# Patient Record
Sex: MALE | Race: WHITE | NOT HISPANIC OR LATINO | Employment: FULL TIME | URBAN - METROPOLITAN AREA
[De-identification: names, ages, dates, MRNs, and addresses within clinical notes are randomized per-mention and may not be internally consistent; named-entity substitution may affect disease eponyms.]

---

## 2017-03-22 ENCOUNTER — ALLSCRIPTS OFFICE VISIT (OUTPATIENT)
Dept: OTHER | Facility: OTHER | Age: 62
End: 2017-03-22

## 2017-03-22 DIAGNOSIS — I10 ESSENTIAL (PRIMARY) HYPERTENSION: ICD-10-CM

## 2017-03-22 DIAGNOSIS — K22.70 BARRETT'S ESOPHAGUS WITHOUT DYSPLASIA: ICD-10-CM

## 2017-03-22 DIAGNOSIS — K21.9 GASTRO-ESOPHAGEAL REFLUX DISEASE WITHOUT ESOPHAGITIS: ICD-10-CM

## 2017-03-22 DIAGNOSIS — R94.5 ABNORMAL RESULTS OF LIVER FUNCTION STUDIES: ICD-10-CM

## 2017-03-22 DIAGNOSIS — E29.1 TESTICULAR HYPOFUNCTION: ICD-10-CM

## 2017-03-22 DIAGNOSIS — N52.9 MALE ERECTILE DYSFUNCTION: ICD-10-CM

## 2017-03-22 DIAGNOSIS — E55.9 VITAMIN D DEFICIENCY: ICD-10-CM

## 2017-03-22 DIAGNOSIS — Z12.5 ENCOUNTER FOR SCREENING FOR MALIGNANT NEOPLASM OF PROSTATE: ICD-10-CM

## 2017-03-23 ENCOUNTER — APPOINTMENT (OUTPATIENT)
Dept: LAB | Facility: MEDICAL CENTER | Age: 62
End: 2017-03-23
Payer: COMMERCIAL

## 2017-03-23 DIAGNOSIS — K21.9 GASTRO-ESOPHAGEAL REFLUX DISEASE WITHOUT ESOPHAGITIS: ICD-10-CM

## 2017-03-23 DIAGNOSIS — N52.9 MALE ERECTILE DYSFUNCTION: ICD-10-CM

## 2017-03-23 DIAGNOSIS — I10 ESSENTIAL (PRIMARY) HYPERTENSION: ICD-10-CM

## 2017-03-23 DIAGNOSIS — E29.1 TESTICULAR HYPOFUNCTION: ICD-10-CM

## 2017-03-23 DIAGNOSIS — K22.70 BARRETT'S ESOPHAGUS WITHOUT DYSPLASIA: ICD-10-CM

## 2017-03-23 DIAGNOSIS — E55.9 VITAMIN D DEFICIENCY: ICD-10-CM

## 2017-03-23 DIAGNOSIS — A04.8 OTHER SPECIFIED BACTERIAL INTESTINAL INFECTIONS: ICD-10-CM

## 2017-03-23 DIAGNOSIS — R94.5 ABNORMAL RESULTS OF LIVER FUNCTION STUDIES: ICD-10-CM

## 2017-03-23 DIAGNOSIS — Z12.5 ENCOUNTER FOR SCREENING FOR MALIGNANT NEOPLASM OF PROSTATE: ICD-10-CM

## 2017-03-23 LAB
25(OH)D3 SERPL-MCNC: 46.8 NG/ML (ref 30–100)
ALBUMIN SERPL BCP-MCNC: 3.9 G/DL (ref 3.5–5)
ALP SERPL-CCNC: 59 U/L (ref 46–116)
ALT SERPL W P-5'-P-CCNC: 83 U/L (ref 12–78)
ANION GAP SERPL CALCULATED.3IONS-SCNC: 5 MMOL/L (ref 4–13)
AST SERPL W P-5'-P-CCNC: 28 U/L (ref 5–45)
BASOPHILS # BLD AUTO: 0.02 THOUSANDS/ΜL (ref 0–0.1)
BASOPHILS NFR BLD AUTO: 0 % (ref 0–1)
BILIRUB SERPL-MCNC: 0.64 MG/DL (ref 0.2–1)
BUN SERPL-MCNC: 13 MG/DL (ref 5–25)
CALCIUM SERPL-MCNC: 9 MG/DL (ref 8.3–10.1)
CHLORIDE SERPL-SCNC: 105 MMOL/L (ref 100–108)
CHOLEST SERPL-MCNC: 128 MG/DL (ref 50–200)
CO2 SERPL-SCNC: 31 MMOL/L (ref 21–32)
CREAT SERPL-MCNC: 0.95 MG/DL (ref 0.6–1.3)
EOSINOPHIL # BLD AUTO: 0.11 THOUSAND/ΜL (ref 0–0.61)
EOSINOPHIL NFR BLD AUTO: 2 % (ref 0–6)
ERYTHROCYTE [DISTWIDTH] IN BLOOD BY AUTOMATED COUNT: 14.6 % (ref 11.6–15.1)
GFR SERPL CREATININE-BSD FRML MDRD: >60 ML/MIN/1.73SQ M
GLUCOSE P FAST SERPL-MCNC: 79 MG/DL (ref 65–99)
HCT VFR BLD AUTO: 49.4 % (ref 36.5–49.3)
HDLC SERPL-MCNC: 36 MG/DL (ref 40–60)
HGB BLD-MCNC: 16.5 G/DL (ref 12–17)
LDLC SERPL CALC-MCNC: 67 MG/DL (ref 0–100)
LYMPHOCYTES # BLD AUTO: 2.42 THOUSANDS/ΜL (ref 0.6–4.47)
LYMPHOCYTES NFR BLD AUTO: 32 % (ref 14–44)
MCH RBC QN AUTO: 29.8 PG (ref 26.8–34.3)
MCHC RBC AUTO-ENTMCNC: 33.4 G/DL (ref 31.4–37.4)
MCV RBC AUTO: 89 FL (ref 82–98)
MONOCYTES # BLD AUTO: 0.89 THOUSAND/ΜL (ref 0.17–1.22)
MONOCYTES NFR BLD AUTO: 12 % (ref 4–12)
NEUTROPHILS # BLD AUTO: 4.02 THOUSANDS/ΜL (ref 1.85–7.62)
NEUTS SEG NFR BLD AUTO: 54 % (ref 43–75)
NRBC BLD AUTO-RTO: 0 /100 WBCS
PLATELET # BLD AUTO: 275 THOUSANDS/UL (ref 149–390)
PMV BLD AUTO: 11.3 FL (ref 8.9–12.7)
POTASSIUM SERPL-SCNC: 4 MMOL/L (ref 3.5–5.3)
PROT SERPL-MCNC: 7.6 G/DL (ref 6.4–8.2)
PSA SERPL-MCNC: 2 NG/ML (ref 0–4)
RBC # BLD AUTO: 5.54 MILLION/UL (ref 3.88–5.62)
SODIUM SERPL-SCNC: 141 MMOL/L (ref 136–145)
TESTOST SERPL-MCNC: 299.7 NG/DL (ref 241–827)
TRIGL SERPL-MCNC: 127 MG/DL
WBC # BLD AUTO: 7.47 THOUSAND/UL (ref 4.31–10.16)

## 2017-03-23 PROCEDURE — G0103 PSA SCREENING: HCPCS

## 2017-03-23 PROCEDURE — 82306 VITAMIN D 25 HYDROXY: CPT

## 2017-03-23 PROCEDURE — 85025 COMPLETE CBC W/AUTO DIFF WBC: CPT

## 2017-03-23 PROCEDURE — 87338 HPYLORI STOOL AG IA: CPT

## 2017-03-23 PROCEDURE — 80061 LIPID PANEL: CPT

## 2017-03-23 PROCEDURE — 80053 COMPREHEN METABOLIC PANEL: CPT

## 2017-03-23 PROCEDURE — 36415 COLL VENOUS BLD VENIPUNCTURE: CPT

## 2017-03-23 PROCEDURE — 84403 ASSAY OF TOTAL TESTOSTERONE: CPT

## 2017-03-24 LAB — H PYLORI AG STL QL IA: NEGATIVE

## 2017-03-27 ENCOUNTER — GENERIC CONVERSION - ENCOUNTER (OUTPATIENT)
Dept: OTHER | Facility: OTHER | Age: 62
End: 2017-03-27

## 2017-03-28 ENCOUNTER — GENERIC CONVERSION - ENCOUNTER (OUTPATIENT)
Dept: OTHER | Facility: OTHER | Age: 62
End: 2017-03-28

## 2017-06-14 ENCOUNTER — ALLSCRIPTS OFFICE VISIT (OUTPATIENT)
Dept: OTHER | Facility: OTHER | Age: 62
End: 2017-06-14

## 2017-08-14 DIAGNOSIS — I10 ESSENTIAL (PRIMARY) HYPERTENSION: ICD-10-CM

## 2017-08-14 DIAGNOSIS — E55.9 VITAMIN D DEFICIENCY: ICD-10-CM

## 2017-08-14 DIAGNOSIS — R94.5 ABNORMAL RESULTS OF LIVER FUNCTION STUDIES: ICD-10-CM

## 2017-08-14 DIAGNOSIS — K21.9 GASTRO-ESOPHAGEAL REFLUX DISEASE WITHOUT ESOPHAGITIS: ICD-10-CM

## 2017-08-14 DIAGNOSIS — R51 HEADACHE(784.0): ICD-10-CM

## 2017-09-06 ENCOUNTER — ALLSCRIPTS OFFICE VISIT (OUTPATIENT)
Dept: OTHER | Facility: OTHER | Age: 62
End: 2017-09-06

## 2017-09-07 ENCOUNTER — ALLSCRIPTS OFFICE VISIT (OUTPATIENT)
Dept: OTHER | Facility: OTHER | Age: 62
End: 2017-09-07

## 2017-09-10 ENCOUNTER — LAB CONVERSION - ENCOUNTER (OUTPATIENT)
Dept: OTHER | Facility: OTHER | Age: 62
End: 2017-09-10

## 2017-09-10 LAB
25(OH)D3 SERPL-MCNC: 56 NG/ML (ref 30–100)
A/G RATIO (HISTORICAL): 1.5 (CALC) (ref 1–2.5)
ALBUMIN SERPL BCP-MCNC: 4.3 G/DL (ref 3.6–5.1)
ALP SERPL-CCNC: 75 U/L (ref 40–115)
ALT SERPL W P-5'-P-CCNC: 41 U/L (ref 9–46)
AST SERPL W P-5'-P-CCNC: 19 U/L (ref 10–35)
BASOPHILS # BLD AUTO: 0.4 %
BASOPHILS # BLD AUTO: 19 CELLS/UL (ref 0–200)
BILIRUB SERPL-MCNC: 0.6 MG/DL (ref 0.2–1.2)
BUN SERPL-MCNC: 15 MG/DL (ref 7–25)
BUN/CREA RATIO (HISTORICAL): NORMAL (CALC) (ref 6–22)
CALCIUM SERPL-MCNC: 9.5 MG/DL (ref 8.6–10.3)
CHLORIDE SERPL-SCNC: 105 MMOL/L (ref 98–110)
CHOLEST SERPL-MCNC: 137 MG/DL
CHOLEST/HDLC SERPL: 3.4 (CALC)
CO2 SERPL-SCNC: 29 MMOL/L (ref 20–31)
CREAT SERPL-MCNC: 0.88 MG/DL (ref 0.7–1.25)
DEPRECATED RDW RBC AUTO: 13.8 % (ref 11–15)
EGFR AFRICAN AMERICAN (HISTORICAL): 107 ML/MIN/1.73M2
EGFR-AMERICAN CALC (HISTORICAL): 93 ML/MIN/1.73M2
EOSINOPHIL # BLD AUTO: 1.5 %
EOSINOPHIL # BLD AUTO: 72 CELLS/UL (ref 15–500)
GAMMA GLOBULIN (HISTORICAL): 2.8 G/DL (CALC) (ref 1.9–3.7)
GLUCOSE (HISTORICAL): 86 MG/DL (ref 65–99)
HCT VFR BLD AUTO: 47.9 % (ref 38.5–50)
HDLC SERPL-MCNC: 40 MG/DL
HGB BLD-MCNC: 15.6 G/DL (ref 13.2–17.1)
LDL CHOLESTEROL (HISTORICAL): 78 MG/DL (CALC)
LYMPHOCYTES # BLD AUTO: 1882 CELLS/UL (ref 850–3900)
LYMPHOCYTES # BLD AUTO: 39.2 %
MCH RBC QN AUTO: 28.6 PG (ref 27–33)
MCHC RBC AUTO-ENTMCNC: 32.6 G/DL (ref 32–36)
MCV RBC AUTO: 87.9 FL (ref 80–100)
MONOCYTES # BLD AUTO: 528 CELLS/UL (ref 200–950)
MONOCYTES (HISTORICAL): 11 %
NEUTROPHILS # BLD AUTO: 2299 CELLS/UL (ref 1500–7800)
NEUTROPHILS # BLD AUTO: 47.9 %
NON-HDL-CHOL (CHOL-HDL) (HISTORICAL): 97 MG/DL (CALC)
PLATELET # BLD AUTO: 188 THOUSAND/UL (ref 140–400)
PMV BLD AUTO: 11.3 FL (ref 7.5–12.5)
POTASSIUM SERPL-SCNC: 4.3 MMOL/L (ref 3.5–5.3)
RBC # BLD AUTO: 5.45 MILLION/UL (ref 4.2–5.8)
SODIUM SERPL-SCNC: 141 MMOL/L (ref 135–146)
TESTOSTERONE FREE (HISTORICAL): 31.2 PG/ML (ref 35–155)
TESTOSTERONE TOTAL (HISTORICAL): 190 NG/DL (ref 250–1100)
TOTAL PROTEIN (HISTORICAL): 7.1 G/DL (ref 6.1–8.1)
TRIGL SERPL-MCNC: 103 MG/DL
TSH SERPL DL<=0.05 MIU/L-ACNC: 1.47 MIU/L (ref 0.4–4.5)
WBC # BLD AUTO: 4.8 THOUSAND/UL (ref 3.8–10.8)

## 2017-09-11 ENCOUNTER — GENERIC CONVERSION - ENCOUNTER (OUTPATIENT)
Dept: OTHER | Facility: OTHER | Age: 62
End: 2017-09-11

## 2017-09-15 ENCOUNTER — GENERIC CONVERSION - ENCOUNTER (OUTPATIENT)
Dept: OTHER | Facility: OTHER | Age: 62
End: 2017-09-15

## 2017-09-19 ENCOUNTER — ALLSCRIPTS OFFICE VISIT (OUTPATIENT)
Dept: OTHER | Facility: OTHER | Age: 62
End: 2017-09-19

## 2017-10-16 RX ORDER — SILDENAFIL CITRATE 20 MG/1
20 TABLET ORAL 3 TIMES DAILY
COMMUNITY
End: 2018-11-06 | Stop reason: SDUPTHER

## 2017-10-16 RX ORDER — FLAXSEED OIL 1000 MG
CAPSULE ORAL
COMMUNITY
End: 2018-09-10

## 2017-10-16 RX ORDER — DIPHENOXYLATE HYDROCHLORIDE AND ATROPINE SULFATE 2.5; .025 MG/1; MG/1
1 TABLET ORAL DAILY
COMMUNITY

## 2017-10-16 RX ORDER — LANOLIN ALCOHOL/MO/W.PET/CERES
1 CREAM (GRAM) TOPICAL 3 TIMES DAILY
COMMUNITY
End: 2018-05-23

## 2017-10-16 RX ORDER — RIBOFLAVIN (VITAMIN B2) 100 MG
100 TABLET ORAL DAILY
COMMUNITY

## 2017-10-16 RX ORDER — PANTOPRAZOLE SODIUM 40 MG/1
40 TABLET, DELAYED RELEASE ORAL DAILY
COMMUNITY
End: 2018-04-24

## 2017-10-16 RX ORDER — TESTOSTERONE 10 MG/.5G
GEL, METERED TOPICAL
COMMUNITY
End: 2018-05-23

## 2017-10-16 RX ORDER — OLMESARTAN MEDOXOMIL AND HYDROCHLOROTHIAZIDE 20/12.5 20; 12.5 MG/1; MG/1
1 TABLET ORAL DAILY
COMMUNITY
End: 2018-04-23 | Stop reason: SDUPTHER

## 2017-10-16 RX ORDER — MULTIVITAMIN WITH IRON
100 TABLET ORAL DAILY
COMMUNITY
End: 2018-05-23

## 2017-10-16 RX ORDER — VITAMIN E 268 MG
400 CAPSULE ORAL DAILY
COMMUNITY
End: 2018-05-23

## 2017-10-16 RX ORDER — LANOLIN ALCOHOL/MO/W.PET/CERES
CREAM (GRAM) TOPICAL DAILY
COMMUNITY
End: 2018-09-10

## 2017-10-17 ENCOUNTER — ANESTHESIA EVENT (OUTPATIENT)
Dept: PERIOP | Facility: HOSPITAL | Age: 62
End: 2017-10-17
Payer: COMMERCIAL

## 2017-10-18 ENCOUNTER — HOSPITAL ENCOUNTER (OUTPATIENT)
Facility: HOSPITAL | Age: 62
Setting detail: OUTPATIENT SURGERY
Discharge: HOME | End: 2017-10-18
Attending: INTERNAL MEDICINE | Admitting: INTERNAL MEDICINE
Payer: COMMERCIAL

## 2017-10-18 ENCOUNTER — ANESTHESIA (OUTPATIENT)
Dept: PERIOP | Facility: HOSPITAL | Age: 62
End: 2017-10-18
Payer: COMMERCIAL

## 2017-10-18 ENCOUNTER — GENERIC CONVERSION - ENCOUNTER (OUTPATIENT)
Dept: OTHER | Facility: OTHER | Age: 62
End: 2017-10-18

## 2017-10-18 VITALS
BODY MASS INDEX: 33.03 KG/M2 | OXYGEN SATURATION: 96 % | WEIGHT: 205.5 LBS | HEART RATE: 60 BPM | RESPIRATION RATE: 18 BRPM | TEMPERATURE: 97.2 F | HEIGHT: 66 IN | SYSTOLIC BLOOD PRESSURE: 135 MMHG | DIASTOLIC BLOOD PRESSURE: 82 MMHG

## 2017-10-18 DIAGNOSIS — K22.70 BARRETT'S ESOPHAGUS WITHOUT DYSPLASIA: ICD-10-CM

## 2017-10-18 DIAGNOSIS — K21.9 GERD (GASTROESOPHAGEAL REFLUX DISEASE): ICD-10-CM

## 2017-10-18 PROCEDURE — 88305 TISSUE EXAM BY PATHOLOGIST: CPT | Performed by: INTERNAL MEDICINE

## 2017-10-18 PROCEDURE — 88342 IMHCHEM/IMCYTCHM 1ST ANTB: CPT | Performed by: INTERNAL MEDICINE

## 2017-10-18 PROCEDURE — 88313 SPECIAL STAINS GROUP 2: CPT | Performed by: INTERNAL MEDICINE

## 2017-10-18 RX ORDER — PROPOFOL 10 MG/ML
INJECTION, EMULSION INTRAVENOUS AS NEEDED
Status: DISCONTINUED | OUTPATIENT
Start: 2017-10-18 | End: 2017-10-18 | Stop reason: SURG

## 2017-10-18 RX ORDER — SODIUM CHLORIDE, SODIUM LACTATE, POTASSIUM CHLORIDE, CALCIUM CHLORIDE 600; 310; 30; 20 MG/100ML; MG/100ML; MG/100ML; MG/100ML
125 INJECTION, SOLUTION INTRAVENOUS CONTINUOUS
Status: DISCONTINUED | OUTPATIENT
Start: 2017-10-18 | End: 2017-10-18 | Stop reason: HOSPADM

## 2017-10-18 RX ADMIN — PROPOFOL 20 MG: 10 INJECTION, EMULSION INTRAVENOUS at 07:33

## 2017-10-18 RX ADMIN — SODIUM CHLORIDE, POTASSIUM CHLORIDE, SODIUM LACTATE AND CALCIUM CHLORIDE 125 ML/HR: 600; 310; 30; 20 INJECTION, SOLUTION INTRAVENOUS at 06:44

## 2017-10-18 RX ADMIN — PROPOFOL 20 MG: 10 INJECTION, EMULSION INTRAVENOUS at 07:31

## 2017-10-18 RX ADMIN — SODIUM CHLORIDE, POTASSIUM CHLORIDE, SODIUM LACTATE AND CALCIUM CHLORIDE: 600; 310; 30; 20 INJECTION, SOLUTION INTRAVENOUS at 07:05

## 2017-10-18 RX ADMIN — PROPOFOL 120 MG: 10 INJECTION, EMULSION INTRAVENOUS at 07:28

## 2017-10-18 NOTE — ANESTHESIA POSTPROCEDURE EVALUATION
Post-Op Assessment Note      CV Status:  Stable    Mental Status:  Alert and awake    Hydration Status:  Stable    PONV Controlled:  None    Airway Patency:  Patent and adequate    Post Op Vitals Reviewed: Yes          Staff: Anesthesiologist, CRNA           BP  150/88   Temp     Pulse  72   Resp   18   SpO2   100%

## 2017-10-18 NOTE — OP NOTE
**** GI/ENDOSCOPY REPORT ****     PATIENT NAME: VICTOR MANUEL PRESCOTT - VISIT ID:  Patient ID: [id number]   YOB: 1955     INTRODUCTION: Esophagogastroduodenoscopy - A 58 male patient presents for   an outpatient Esophagogastroduodenoscopy at [address] [address]  INDICATIONS: GERD  SSBE on prior exam with negative pathology  CONSENT: The benefits, risks, and alternatives to the procedure were   discussed and informed consent was obtained from the patient  PREPARATION:  EKG, pulse, pulse oximetry and blood pressure were monitored   throughout the procedure  ASA Classification: Class 2 - Patient has mild   to moderate systemic disturbance that may or may not be related to the   disorder requiring surgery  The patient was kept NPO for eight hours prior   to the procedure  MEDICATIONS: MAC anesthesia  PROCEDURE:  The endoscope was passed without difficulty through the mouth   under direct visualization and advanced to the 2nd portion of the   duodenum  The scope was withdrawn and the mucosa was carefully examined  FINDINGS:   Esophagus: A tongue of short-segment Sutherland's esophagus was   found  Multiple biopsies was taken  Stomach: The antrum, body of the   stomach, cardia, and fundus appeared to be normal  Multiple random   biopsies was taken  Duodenum: The duodenal bulb and 2nd portion of the   duodenum appeared to be normal      COMPLICATIONS: There were no complications  IMPRESSIONS: Sutherland's esophagus noted  Multiple biopsies taken  Normal   antrum, body of the stomach, cardia, and fundus  Multiple biopsies taken  Normal duodenal bulb and 2nd portion of the duodenum  RECOMMENDATIONS: Anti-reflux measures: Raise the head of the bed 4 to 6   inches  Avoid smoking  Avoid excess coffee, tea or other caffeinated   beverages  Avoid garments that fit tightly through the abdomen  Avoid   eating before bed  Continue PPI   Follow-up on the results of the biopsy specimens in 1 week  Surveillance pending pathology  ESTIMATED BLOOD LOSS: None  PATHOLOGY SPECIMENS: Multiple biopsies taken  Associated finding:   Sutherland's esophagus  Multiple random biopsies taken  Yes     PROCEDURE CODES: 20034 - EGD flexible; with biopsy     ICD-9 Codes: 530 81 Esophageal reflux 530 85 Sutherland's esophagus     ICD-10 Codes: K21 Gastro-esophageal reflux disease K22 7 Sutherland's   esophagus     PERFORMED BY: AYESHA Ash  on 10/18/2017  Version 1, electronically signed by AYESHA Love  on   10/18/2017 at 07:39

## 2017-10-18 NOTE — DISCHARGE INSTRUCTIONS
Upper Endoscopy   WHAT YOU NEED TO KNOW:   An upper endoscopy is also called an upper gastrointestinal (GI) endoscopy, or an esophagogastroduodenoscopy (EGD)  You may feel bloated, gassy, or have some abdominal discomfort after your procedure  Your throat may be sore for 24 to 36 hours  You may burp or pass gas from air that is still inside your body  DISCHARGE INSTRUCTIONS:   Call 911 for any of the following:   · You have sudden chest pain or trouble breathing  Seek care immediately if:   · You feel dizzy or faint  · You have trouble swallowing  · Your bowel movements are very dark or black  · Your abdomen is hard and firm and you have severe pain  · You vomit blood  Contact your healthcare provider if:   · You feel full or bloated and cannot burp or pass gas  · You have not had a bowel movement for 3 days after your procedure  · You have neck pain  · You have a fever or chills  · You have nausea or are vomiting  · You have a rash or hives  · You have questions or concerns about your endoscopy  Relieve a sore throat:  Suck on throat lozenges or crushed ice  Gargle with a small amount of warm salt water  Mix 1 teaspoon of salt and 1 cup of warm water to make salt water  Relieve gas and discomfort from bloating:  Lie on your right side with a heating pad on your abdomen  Take short walks to help pass gas  Eat small meals until bloating is relieved  Rest after your procedure: You have been given medicine to relax you  Do not  drive or make important decisions until the day after your procedure  Return to your normal activity as directed  You can usually return to work the day after your procedure  Follow up with your healthcare provider as directed:  Write down your questions so you remember to ask them during your visits     © 2017 [address] is for End User's use only and may not be sold, redistributed or otherwise used for commercial purposes  All illustrations and images included in CareNotes® are the copyrighted property of A D A M , Inc  or Ebenezer Farfan  The above information is an  only  It is not intended as medical advice for individual conditions or treatments  Talk to your doctor, nurse or pharmacist before following any medical regimen to see if it is safe and effective for you

## 2017-10-18 NOTE — ANESTHESIA PREPROCEDURE EVALUATION
Review of Systems/Medical History  Patient summary reviewed        Cardiovascular  Hypertension ,    Pulmonary  Negative pulmonary ROS ,        GI/Hepatic    GERD , Esophageal disease sanchez esophagus,        Negative  ROS        Endo/Other  Negative endo/other ROS      GYN  Negative gynecology ROS          Hematology  Negative hematology ROS      Musculoskeletal  Negative musculoskeletal ROS        Neurology  Negative neurology ROS      Psychology   Negative psychology ROS            Physical Exam    Airway    Mallampati score: II  TM Distance: >3 FB  Neck ROM: full     Dental       Cardiovascular  Cardiovascular exam normal    Pulmonary  Pulmonary exam normal     Other Findings        Anesthesia Plan  ASA Score- 2       Anesthesia Type- IV sedation with anesthesia with ASA Monitors  Additional Monitors:   Airway Plan:           Induction- intravenous  Informed Consent- Anesthetic plan and risks discussed with patient

## 2017-10-24 ENCOUNTER — GENERIC CONVERSION - ENCOUNTER (OUTPATIENT)
Dept: OTHER | Facility: OTHER | Age: 62
End: 2017-10-24

## 2017-11-07 ENCOUNTER — ALLSCRIPTS OFFICE VISIT (OUTPATIENT)
Dept: OTHER | Facility: OTHER | Age: 62
End: 2017-11-07

## 2017-11-07 LAB
CLARITY UR: NORMAL
COLOR UR: YELLOW
HGB UR QL STRIP.AUTO: NORMAL
PH UR STRIP.AUTO: 5 [PH]
PROT UR STRIP-MCNC: NORMAL MG/DL
SP GR UR STRIP.AUTO: 1.02

## 2017-11-08 NOTE — CONSULTS
Assessment  1  Male erectile disorder (607 84) (N52 9)   2  Testicular hypogonadism (257 2) (E29 1)    Plan   Male erectile disorder, Testicular hypogonadism    · Urine Dip Non-Automated- POC; Status:Complete - Retrospective By Protocol  Authorization;   Done: [id number] 09:33AM   Performed: In Office; PRADEEP:10NHT5911; Last Updated By:Juan Simental; 11/7/2017 9:38:15 AM;Ordered; For:Male erectile disorder, Testicular hypogonadism; Ordered By:Sudhir Martin; Testicular hypogonadism    · AndroGel Pump 20 25 MG/ACT (1 62%) Transdermal Gel; APPLY TWO PUMP  PRESSES ONE TIME DAILY AS DIRECTED   Rx By: Tye Shaikh; Dispense: 0 Days ; #:75 GM; Refill: 3;For: Testicular hypogonadism; ALEXYS = N; Print Rx   · (1) CBC/ PLT (NO DIFF); Status:Active; Requested for:07May2018; Perform:[facility]; SUP:[id number];NWGTFJA; For:Testicular hypogonadism; Ordered By:Matthew Martin;   · (1) COMPREHENSIVE METABOLIC PANEL; Status:Active; Requested for:07May2018; Perform:[facility]; RFI:[id number];OMFYNWU; For:Testicular hypogonadism; Ordered By:Matthew Martin;   · (1) TESTOSTERONE, FREE (DIRECT) AND TOTAL; Status:Active; Requested  for:07May2018; Perform:[facility]; PSI:[id number];LTCBDGV; For:Testicular hypogonadism; Ordered By:Sudhir Martin; Follow-up visit in 6 months Evaluation and Treatment  Follow-up  Status: Hold For - Scheduling  Requested for: 92ASK7400  Ordered; For: Testicular hypogonadism;  Ordered By: Tye Shaikh  Performed:   Due: 69BJH9799     Discussion/Summary  Discussion Summary:   15-year-old male with erectile dysfunction and low testosterone   had a lengthy discussion with the patient regarding testosterone replacement  We discussed different options including injections, topical therapy, and implants  I will continue the patient on topical therapy  AndroGel was prescribed  He will check with his insurance to make sure it is covered   If it is expensive we can always but it back up on the topical therapy from his compound pharmacy  He will follow up in 6 months with repeat laboratory values  Chief Complaint  Chief Complaint Free Text Note Form: Patient presents for erectile dysfunction;low testosterone      History of Present Illness  HPI: 63-year-old male presents for evaluation of erectile dysfunction low testosterone  The patient has been on testosterone replacement and has been taking generic sildenafil for erectile dysfunction  He is using a topical testosterone through a compounding pharmacy  His primary doctor who is managing this is retiring and his new primary doctor is not comfortable managing this  The medications have been working well but he is out of testosterone  He is having increased fatigue  He has no other complaints  Review of Systems  Complete-Male Urology:   Genitourinary: Empty sensation-- and-- stream quality good, but-- No complaints of dysuria, no incontinence, no hesitancy, no nocturia, no genital lesion, no testicular pain  ROS Reviewed:   ROS reviewed  Active Problems  1  Acute viral conjunctivitis of both eyes (077 99) (B30 9)   2  Benign essential hypertension (401 1) (I10)   3  Elevated liver function tests (790 6) (R79 89)   4  Encounter for prostate cancer screening (V76 44) (Z12 5)   5  GERD without esophagitis (530 81) (K21 9)   6  Male erectile disorder (607 84) (N52 9)   7  Screening for colon cancer (V76 51) (Z12 11)   8  Testicular hypogonadism (257 2) (E29 1)   9  Vitamin D deficiency (268 9) (E55 9)    Past Medical History  1  Denied: History of depression   2  History of frequent headaches   3  History of Helicobacter pylori infection (V12 09) (Z86 19)   4  Denied: History of substance abuse   5  History of Lyme disease (088 81) (A69 20)  Active Problems And Past Medical History Reviewed: The active problems and past medical history were reviewed and updated today  Surgical History  1   History of Hernia Repair  Surgical History Reviewed: The surgical history was reviewed and updated today  Family History  Mother    1  Denied: Family history of depression   2  Family history of hypotension (V17 49) (Z82 49)   3  Denied: Family history of substance abuse  Father    4  Family history of Cerebral hemorrhage   5  Denied: Family history of depression   6  Denied: Family history of substance abuse  Family History Reviewed: The family history was reviewed and updated today  Social History   · Always uses seat belt   · Does not drink alcohol (V49 89) (Z78 9)   · Never a smoker  Social History Reviewed: The social history was reviewed and updated today  Current Meds   1  Apple Cider Vinegar TABS; Therapy: (Regis Lundy) to Recorded   2  Calcium TABS; Therapy: (Regis Lundy) to Recorded   3   MG CAPS; Therapy: (Regis Lundy) to Recorded   4  Co Q-10 CAPS; Therapy: (Regis Lundy) to Recorded   5  Creatine CAPS; Therapy: (Regis Lundy) to Recorded   6  CVS D3 5000 UNIT Oral Capsule; take 1 capsule daily; Last Rx:06Sep2017 Ordered   7  Flax Seed Oil CAPS; Therapy: (Regis Lundy) to Recorded   8  Garlic TABS; Therapy: (Regis Lundy) to Recorded   9  Glucosamine-Chondroitin Oral Tablet; Therapy: (Regis Lundy) to Recorded   10  Magnesium TABS; Therapy: (Regis Lundy) to Recorded   11  Milk Thistle CAPS; Therapy: (Regis Lundy) to Recorded   12  Multiple Vitamin TABS; Therapy: (Regis Lundy) to Recorded   13  Olmesartan Medoxomil-HCTZ 20-12 5 MG Oral Tablet; TAKE 1 TABLET DAILY; Therapy: 70PSR0182 to (Evaluate:77Iar1971)  Requested for: 79GZC3855; Last    Rx:22Mar2017 Ordered   14  Selenium 200 MCG Oral Capsule; Therapy: (Regis Lundy) to Recorded   15  Sildenafil Citrate 20 MG Oral Tablet; Take 2-5 tablets prn sexual activity; Therapy: 82FPX8049 to (Last Rx:22Mar2017) Ordered   16   Testosterone Micronized Crystals; Therapy: 98XIO0218 to (Last Rx:15Jun2011)  Requested for: 35EIQ9674 Ordered   17  Verapamil HCl  MG Oral Tablet Extended Release; take 1 tablet every day; Therapy: 51YBZ7220 to (Jennifer Amaya)  Requested for: 31MQG8292; Last    Rx:69Mht4377 Ordered   18  Vitamin B-12 1000 MCG Oral Tablet; Therapy: (Deryl Seaview) to Recorded   19  Vitamin B-6 100 MG Oral Tablet; Therapy: (Deryl Seaview) to Recorded   20  Vitamin C TABS; Therapy: (Deryl Seaview) to Recorded   21  Vitamin E 400 UNIT Oral Capsule; Therapy: (Deryl Seaview) to Recorded   22  Zinc CAPS; Therapy: (Deryl Seaview) to Recorded  Medication List Reviewed: The medication list was reviewed and updated today  Allergies  1  No Known Drug Allergies    Vitals  Vital Signs    Recorded: 77CLL3834 09:33AM   Heart Rate 68   Systolic 091   Diastolic 868   Height 5 ft 7 in   Weight 209 lb 2 oz   BMI Calculated 32 75   BSA Calculated 2 06     Physical Exam    Constitutional   General appearance: No acute distress, well appearing and well nourished  Pulmonary   Respiratory effort: No increased work of breathing or signs of respiratory distress  Cardiovascular   Examination of extremities for edema and/or varicosities: Normal     Abdomen   Abdomen: Non-tender, no masses  Liver and spleen: No hepatomegaly or splenomegaly  Genitourinary   Anus and perineum: Normal     Scrotum contents: Normal size, no masses  Epididymis: Normal, no masses  Testes: Normal testes, no masses  Urethral meatus: Normal, no lesions  Penis: Normal, no lesions  Digital rectal exam of prostate: Abnormal  -- 30 g, smooth, no nodules, nontender  Digital rectal exam of seminal vesicles: Normal size, no masses  Anus, perineum, and rectum: Normal     Musculoskeletal   Gait and station: Normal     Skin   Skin and subcutaneous tissue: Normal without rashes or lesions      Lymphatic   Palpation of lymph nodes in groin: Normal     Additional Exam:  Neuro exam nonfocal       Results/Data  Urine Dip Non-Automated- POC 48PLX7695 09:33AM Brijesh Wall     Test Name Result Flag Reference   Color Yellow     Clarity Transparent     Blood -     Protein -     Ph 5 0     Specific Gravity 1 020       (Q) TESTOSTERONE, FREE AND TOTAL, LC/MS/MS 12Uyq1564 10:12AM Carl Hernandez   REPORT COMMENT:  LAB REQ = [id number]  FASTING:YES     Test Name Result Flag Reference   TESTOSTERONE, TOTAL,$LC/MS/ ng/dL L 250-1100   Men with clinically significant hypogonadal  symptoms and testosterone values repeatedly in  the range of the 200-300 ng/dL or less, may  benefit from testosterone treatment after  adequate risk and benefits counseling  For more information on this test, go to  http://[employer]/faq/  TotalTestosteroneLCMSMS        This test was developed and its analytical performance  characteristics have been determined by [address] [place] [address]  It has  not been cleared or approved by the [place]  Food and Drug  Administration  This assay has been validated pursuant  to the CLIA regulations and is used for clinical  purposes  FREE TESTOSTERONE 31 2 pg/mL L 35 0-155 0   This test was developed and its analytical performance  characteristics have been determined by [address] [place] [address]  It has  not been cleared or approved by the [place]  Food and Drug  Administration  This assay has been validated pursuant  to the CLIA regulations and is used for clinical  purposes  Future Appointments    Date/Time Provider Specialty Site   12/14/2017 10:30 AM AYESHA Prince  [address] [place] [address]   05/09/2018 09:15 AM AYESHA Martinez   Urology [address]     Signatures   Electronically signed by : AYESHA Beltran ; Nov 7 2017  2:29PM EST                       (Author)

## 2018-01-10 NOTE — RESULT NOTES
Verified Results  (1) LIPID PANEL, FASTING 15Sep2016 09:36AM Will Ramos     Test Name Result Flag Reference   CHOLESTEROL, TOTAL 110 mg/dL L 125-200   HDL CHOLESTEROL 32 mg/dL L > OR = 40   TRIGLICERIDES 80 mg/dL  <140   LDL-CHOLESTEROL 62 mg/dL (calc)  <130   Desirable range <100 mg/dL for patients with CHD or  diabetes and <70 mg/dL for diabetic patients with  known heart disease  CHOL/HDLC RATIO 3 4 (calc)  < OR = 5 0   NON HDL CHOLESTEROL 78 mg/dL (calc)     Target for non-HDL cholesterol is 30 mg/dL higher than   LDL cholesterol target  (1) CBC/PLT/DIFF 15Sep2016 09:36AM Brain Matas     Test Name Result Flag Reference   WHITE BLOOD CELL COUNT 6 5 Thousand/uL  3 8-10 8   RED BLOOD CELL COUNT 6 05 Million/uL H 4 20-5 80   HEMOGLOBIN 17 1 g/dL  13 2-17 1   HEMATOCRIT 54 8 % H 38 5-50 0   MCV 90 7 fL  80 0-100 0   MCH 28 4 pg  27 0-33 0   MCHC 31 3 g/dL L 32 0-36 0   RDW 15 1 % H 11 0-15 0   PLATELET COUNT 306 Thousand/uL  140-400   MPV 9 3 fL  7 5-11 5   ABSOLUTE NEUTROPHILS 3848 cells/uL  2410-5922   ABSOLUTE LYMPHOCYTES 1833 cells/uL  850-3900   ABSOLUTE MONOCYTES 709 cells/uL  200-950   ABSOLUTE EOSINOPHILS 72 cells/uL     ABSOLUTE BASOPHILS 39 cells/uL  0-200   NEUTROPHILS 59 2 %     LYMPHOCYTES 28 2 %     MONOCYTES 10 9 %     EOSINOPHILS 1 1 %     BASOPHILS 0 6 %       (1) COMPREHENSIVE METABOLIC PANEL [id number] 97:64YH [facility] [employer]     Test Name Result Flag Reference   GLUCOSE 86 mg/dL  65-99   Fasting reference interval   UREA NITROGEN (BUN) 16 mg/dL  7-25   CREATININE 1 06 mg/dL  0 70-1 25   For patients >52years of age, the reference limit  for Creatinine is approximately 13% higher for people  identified as -American  eGFR NON-AFR   AMERICAN 76 mL/min/1 73m2  > OR = 60   eGFR AFRICAN AMERICAN 88 mL/min/1 73m2  > OR = 60   BUN/CREATININE RATIO   7-31   NOT APPLICABLE (calc)   SODIUM 140 mmol/L  135-146   POTASSIUM 4 4 mmol/L  3 5-5 3   CHLORIDE 103 mmol/L   CARBON DIOXIDE 28 mmol/L  20-31   CALCIUM 9 5 mg/dL  8 6-10 3   PROTEIN, TOTAL 7 1 g/dL  6 1-8 1   ALBUMIN 4 6 g/dL  3 6-5 1   GLOBULIN 2 5 g/dL (calc)  1 9-3 7   ALBUMIN/GLOBULIN RATIO 1 8 (calc)  1 0-2 5   BILIRUBIN, TOTAL 0 8 mg/dL  0 2-1 2   ALKALINE PHOSPHATASE 54 U/L     AST 37 U/L H 10-35   ALT 97 U/L H 9-46     (1) GGT [id number] 09:36AM Jodie Ramos     Test Name Result Flag Reference   GGT 29 U/L  3-70     *(Q) VITAMIN D, 25-HYDROXY, LC/MS/MS 15Jfl5295 09:36AM Jodie Ramos     Test Name Result Flag Reference   VITAMIN D, 25-OH, TOTAL 84 ng/mL     Vitamin D Status         25-OH Vitamin D:     Deficiency:                    <20 ng/mL  Insufficiency:             20 - 29 ng/mL  Optimal:                 > or = 30 ng/mL     For 25-OH Vitamin D testing on patients on   D2-supplementation and patients for whom quantitation   of D2 and D3 fractions is required, the QuestAssureD(TM)  25-OH VIT D, (D2,D3), LC/MS/MS is recommended: order   code 56589 (patients >2yrs)  For more information on this test, go to:  http://[employer]/faq/ZLO533  (This link is being provided for   informational/educational purposes only )     (Q) TESTOSTERONE, FREE AND TOTAL, LC/MS/MS 66Fpr4103 09:36AM Jodie Ramos   REPORT COMMENT:  FASTING:YES     Test Name Result Flag Reference   TESTOSTERONE, TOTAL,$LC/MS/ ng/dL  250-1100   For more information on this test, go to  http://[employer]/faq/  TotalTestosteroneLCMSMS   FREE TESTOSTERONE 90 0 pg/mL  35 0-155 0

## 2018-01-12 VITALS
HEART RATE: 66 BPM | SYSTOLIC BLOOD PRESSURE: 134 MMHG | WEIGHT: 229 LBS | OXYGEN SATURATION: 98 % | RESPIRATION RATE: 16 BRPM | BODY MASS INDEX: 35.94 KG/M2 | DIASTOLIC BLOOD PRESSURE: 84 MMHG | TEMPERATURE: 97.4 F | HEIGHT: 67 IN

## 2018-01-13 VITALS
TEMPERATURE: 97 F | HEIGHT: 67 IN | RESPIRATION RATE: 16 BRPM | SYSTOLIC BLOOD PRESSURE: 144 MMHG | WEIGHT: 202 LBS | OXYGEN SATURATION: 97 % | HEART RATE: 70 BPM | DIASTOLIC BLOOD PRESSURE: 90 MMHG | BODY MASS INDEX: 31.71 KG/M2

## 2018-01-13 VITALS
WEIGHT: 201 LBS | HEART RATE: 68 BPM | OXYGEN SATURATION: 98 % | TEMPERATURE: 97.4 F | SYSTOLIC BLOOD PRESSURE: 128 MMHG | RESPIRATION RATE: 16 BRPM | BODY MASS INDEX: 31.55 KG/M2 | HEIGHT: 67 IN | DIASTOLIC BLOOD PRESSURE: 74 MMHG

## 2018-01-13 VITALS
DIASTOLIC BLOOD PRESSURE: 100 MMHG | BODY MASS INDEX: 32.82 KG/M2 | HEIGHT: 67 IN | HEART RATE: 68 BPM | SYSTOLIC BLOOD PRESSURE: 160 MMHG | WEIGHT: 209.13 LBS

## 2018-01-13 VITALS
SYSTOLIC BLOOD PRESSURE: 130 MMHG | DIASTOLIC BLOOD PRESSURE: 90 MMHG | BODY MASS INDEX: 31.55 KG/M2 | HEIGHT: 67 IN | HEART RATE: 76 BPM | WEIGHT: 201 LBS

## 2018-01-13 VITALS
OXYGEN SATURATION: 98 % | BODY MASS INDEX: 32.18 KG/M2 | TEMPERATURE: 97.4 F | SYSTOLIC BLOOD PRESSURE: 138 MMHG | WEIGHT: 205 LBS | HEIGHT: 67 IN | DIASTOLIC BLOOD PRESSURE: 86 MMHG | HEART RATE: 68 BPM

## 2018-01-13 NOTE — RESULT NOTES
Verified Results  (1) TISSUE EXAM [id number] 07:30AM Fam Casrto     Test Name Result Flag Reference   LAB AP CASE REPORT (Report)     Surgical Pathology Report             Case: [id number]                   Authorizing Provider: Filipe Wolff MD  Collected:      10/18/2017 0730        Ordering Location:   [address] [place] [address] Received:      10/18/2017 1210                    Operating Room                                 Pathologist:      Renée Singh MD                                Specimens:  A) - Stomach, stomach                                         B) - Esophagus, distal esophagus   LAB AP FINAL DIAGNOSIS (Report)     A  Stomach, stomach biopsy:  - Oxyntic and antral gastric mucosa with mild chronic inactive gastritis   and vascular congestion   - Negative for curvilinear Helicobacter organisms, confirmed by   immunostain   - No glandular atrophy or intestinal metaplasia identified   - No glandular dysplasia and no evidence of malignancy  B  Esophagus, distal esophagus biopsy:  - Benign squamocolumnar mucosa without goblet cells; negative for   intestinal    metaplasia/Sutherland's esophagus  -- Confirmed by special stain AB/PAS  - Pancreatic heterotopia  - Mild chronic inactive inflammation with non-specific reactive changes;   no active    esophagitis, no intraepithelial eosinophils   - Negative for dysplasia and malignancy  Interpretation performed at [facility], [address] [place] [address]  Electronically signed by Renée Singh MD on 10/23/2017 at 2:42 PM   LAB AP SURGICAL ADDITIONAL INFORMATION (Report)     All controls performed with the immunohistochemical stains reported above   reacted appropriately  These tests were developed and their performance   characteristics determined by [facility] or   [facility]  They may not be cleared or approved by the [place]  Food and Drug Administration   The FDA has determined that such clearance   or approval is not necessary  These tests are used for clinical purposes  They should not be regarded as investigational or for research  This   laboratory has been approved by [place] [address], designated as a high-complexity   laboratory and is qualified to perform these tests  LAB AP GROSS DESCRIPTION (Report)     A  The specimen is received in formalin, labeled with the patient's name   and hospital number, and is designated stomach biopsy  The specimen   consists of 4 tan-pink soft tissue fragments measuring 0 3 cm, 0 4 cm, 0 5   cm, and 0 6 cm in greatest dimension  Entirely submitted  One cassette  B  The specimen is received in formalin, labeled with the patient's name   and hospital number, and is designated distal esophagus biopsy  The   specimen consists of 4 white tan-pink soft tissue fragments measuring 0 2   cm, 0 2 cm, 0 3 cm, and 0 4 cm in greatest dimension  Entirely submitted  One cassette  Note: The estimated total formalin fixation time based upon information   provided by the submitting clinician and the standard processing schedule   is under 72 hours  MAS   LAB AP CLINICAL INFORMATION      Cold bx    R/o H  pylori, Sutherland's esophagus   Cold bx    R/o h pylori   LAB AP ADDENDUM 1      [facility] [address] [facility], [address] [place] [address]    Addendum electronically signed by Harpreet Quijano MD on 10/23/2017 at 2:45 PM

## 2018-01-16 NOTE — RESULT NOTES
Verified Results  (1) Markus Dixon, GREYSON [id number] 10:39AM Мария Matute Order Number: [id number]  TW Order Number: [id number]     Test Name Result Flag Reference   H PYLORI ANTIGEN Negative  Negative   Performed at:  [address] [employer] [address]  [id number]  : Kvng Torres MD, Phone:  [phone]

## 2018-01-16 NOTE — MISCELLANEOUS
Message   Recorded as Task   Date: 09/13/2017 02:37 PM, Created By: System   Task Name: Rx Renew Request   Assigned To: Markel Newton   Regarding Patient: Nate Greene, Status: Active   Comment:    System - 13 Sep 2017 2:37 PM     PHARMACY: [facility]  PATIENT: Nate Greene  MEDICATION: C-DHEA50/T125/P12MG PER ML CR   Spoke with patient  Aware covering physicians do not prescribe or renew HRT  Patient will contact pharmacy for name of HRT doctor in area  Active Problems    1  Sutherland's esophagus without dysplasia (530 85) (K22 70)   2  Benign essential hypertension (401 1) (I10)   3  Elevated liver function tests (790 6) (R79 89)   4  Encounter for prostate cancer screening (V76 44) (Z12 5)   5  GERD without esophagitis (530 81) (K21 9)   6  Male erectile disorder (607 84) (N52 9)   7  Screening for colon cancer (V76 51) (Z12 11)   8  Testicular hypogonadism (257 2) (E29 1)   9  Vitamin D deficiency (268 9) (E55 9)    Current Meds   1  Apple Cider Vinegar TABS; Therapy: ([facility]) to Recorded   2  Calcium TABS; Therapy: ([facility]) to Recorded   3   MG CAPS; Therapy: ([facility]) to Recorded   4  Co Q-10 CAPS; Therapy: ([facility]) to Recorded   5  Creatine CAPS; Therapy: ([facility]) to Recorded   6  CVS D3 5000 UNIT Oral Capsule; take 1 capsule daily; Last Rx:68Lga8741 Ordered   7  Flax Seed Oil CAPS; Therapy: ([facility]) to Recorded   8  Garlic TABS; Therapy: ([facility]) to Recorded   9  Glucosamine-Chondroitin Oral Tablet; Therapy: ([facility]) to Recorded   10  Magnesium TABS; Therapy: ([facility]) to Recorded   11  Milk Thistle CAPS; Therapy: ([facility]) to Recorded   12  Multiple Vitamin TABS; Therapy: ([facility]) to Recorded   13  Olmesartan Medoxomil-HCTZ 20-12 5 MG Oral Tablet (Benicar HCT); TAKE 1 TABLET    DAILY;     Therapy: 65OQZ1096 to (Evaluate:70Dlx2491)  Requested for: 70NNZ8899; Last    Rx:22Mar2017 Ordered   14  Pantoprazole Sodium 40 MG Oral Tablet Delayed Release (Protonix); TAKE 1 TABLET    ONCE; Therapy: 23MQU2720 to (Renew:69Inp2071)  Requested for: 07ZOE0177; Last    Rx:22Mar2017 Ordered   15  Progesterone Micronized Powder; Therapy: 96ZBW7411 to (Last Rx:15Jun2011)  Requested for: 52KEL2986 Ordered   16  Selenium 200 MCG Oral Capsule; Therapy: ([facility]) to Recorded   17  Sildenafil Citrate 20 MG Oral Tablet; Take 2-5 tablets prn sexual activity; Therapy: 91ZGD4560 to (Last Rx:22Mar2017) Ordered   18  Testosterone Micronized Crystals; Therapy: 02IOY6330 to (Last Rx:15Jun2011)  Requested for: 20YQF4805 Ordered   19  Verapamil HCl  MG Oral Tablet Extended Release; take 1 tablet every day; Therapy: 51EAR0413 to ([place] [facility])  Requested for: 63FCP6746; Last    Rx:75Tnd5583 Ordered   20  Vitamin B-12 1000 MCG Oral Tablet; Therapy: ([facility]) to Recorded   21  Vitamin B-6 100 MG Oral Tablet; Therapy: (Dennis [facility]an[facility]) to Recorded   22  Vitamin C TABS; Therapy: (Dennis Guanaco) to Recorded   23  Vitamin E 400 UNIT Oral Capsule; Therapy: (Dennis Guanaco) to Recorded   24  Zinc CAPS; Therapy: (Recorded:38Wod9728) to Recorded    Allergies    1   No Known Drug Allergies    Signatures   Electronically signed by : Janell Elliott OM; Sep 15 2017 11:35AM EST                       (Author)

## 2018-01-16 NOTE — RESULT NOTES
Verified Results  (1) LIPID PANEL, FASTING 28Fha3550 10:12AM [facility]     Test Name Result Flag Reference   CHOLESTEROL, TOTAL 137 mg/dL  <200   HDL CHOLESTEROL 40 mg/dL L >85   TRIGLICERIDES 312 mg/dL  <150   LDL-CHOLESTEROL 78 mg/dL (calc)     Reference range: <100     Desirable range <100 mg/dL for patients with CHD or  diabetes and <70 mg/dL for diabetic patients with  known heart disease  The [facility] calculation is a validated novel   method that provides better accuracy than the   Friedewald equation in the estimation of LDL-C,   particularly when TG levels are 150-400 mg/dL and   LDL-C levels are lower than 70 mg/dL  Reference:  Spencer Kee et al  Comparison of a Novel   Method vs the Friedewald Equation for Estimating   Low-Density Lipoprotein Cholesterol Levels From the   Standard Lipid Profile  TAWANDA  0496;750): 3363-7095  For additional information, please refer to  http://[email or web address]/VXO329  (This link is being provided for informational/  educational purposes only )   CHOL/HDLC RATIO 3 4 (calc)  <5 0   NON HDL CHOLESTEROL 97 mg/dL (calc)  <130   For patients with diabetes plus 1 major ASCVD risk   factor, treating to a non-HDL-C goal of <100 mg/dL   (LDL-C of <70 mg/dL) is considered a therapeutic   option  (1) COMPREHENSIVE METABOLIC PANEL 64RMQ4827 36:62WB [facility]     Test Name Result Flag Reference   GLUCOSE 86 mg/dL  65-99   Fasting reference interval   UREA NITROGEN (BUN) 15 mg/dL  7-25   CREATININE 0 88 mg/dL  0 70-1 25   For patients >52years of age, the reference limit  for Creatinine is approximately 13% higher for people  identified as -American  eGFR NON-AFR   AMERICAN 93 mL/min/1 73m2  > OR = 60   eGFR AFRICAN AMERICAN 107 mL/min/1 73m2  > OR = 60   BUN/CREATININE RATIO   6-79   NOT APPLICABLE (calc)   SODIUM 141 mmol/L  135-146   POTASSIUM 4 3 mmol/L  3 5-5 3   CHLORIDE 105 mmol/L     CARBON DIOXIDE 29 mmol/L 20-31   CALCIUM 9 5 mg/dL  8 6-10 3   PROTEIN, TOTAL 7 1 g/dL  6 1-8 1   ALBUMIN 4 3 g/dL  3 6-5 1   GLOBULIN 2 8 g/dL (calc)  1 9-3 7   ALBUMIN/GLOBULIN RATIO 1 5 (calc)  1 0-2 5   BILIRUBIN, TOTAL 0 6 mg/dL  0 2-1 2   ALKALINE PHOSPHATASE 75 U/L     AST 19 U/L  10-35   ALT 41 U/L  9-46     (1) CBC/PLT/DIFF 62BNQ4628 10:12AM [facility]     Test Name Result Flag Reference   WHITE BLOOD CELL COUNT 4 8 Thousand/uL  3 8-10 8   RED BLOOD CELL COUNT 5 45 Million/uL  4 20-5 80   HEMOGLOBIN 15 6 g/dL  13 2-17 1   HEMATOCRIT 47 9 %  38 5-50 0   MCV 87 9 fL  80 0-100 0   MCH 28 6 pg  27 0-33 0   MCHC 32 6 g/dL  32 0-36 0   RDW 13 8 %  11 0-15 0   PLATELET COUNT 458 Thousand/uL  140-400   ABSOLUTE NEUTROPHILS 2299 cells/uL  2183-6390   ABSOLUTE LYMPHOCYTES 1882 cells/uL  850-3900   ABSOLUTE MONOCYTES 528 cells/uL  200-950   ABSOLUTE EOSINOPHILS 72 cells/uL     ABSOLUTE BASOPHILS 19 cells/uL  0-200   NEUTROPHILS 47 9 %     LYMPHOCYTES 39 2 %     MONOCYTES 11 0 %     EOSINOPHILS 1 5 %     BASOPHILS 0 4 %     MPV 11 3 fL  7 5-12 5     (Q) TSH, 3RD GENERATION 08Azz7525 10:12AM [facility]     Test Name Result Flag Reference   TSH 1 47 mIU/L  0 40-4 50     *(Q) VITAMIN D, 25-HYDROXY, LC/MS/MS 32Qeu2880 10:12AM [facility]     Test Name Result Flag Reference   VITAMIN D, 25-OH, TOTAL 56 ng/mL     Vitamin D Status         25-OH Vitamin D:     Deficiency:                    <20 ng/mL  Insufficiency:             20 - 29 ng/mL  Optimal:                 > or = 30 ng/mL     For 25-OH Vitamin D testing on patients on   D2-supplementation and patients for whom quantitation   of D2 and D3 fractions is required, the QuestAssureD(TM)  25-OH VIT D, (D2,D3), LC/MS/MS is recommended: order   code 41958 (patients >2yrs)  For more information on this test, go to:  [email or web address]  (This link is being provided for   informational/educational purposes only )     (Q) TESTOSTERONE, FREE AND TOTAL, LC/MS/MS 43Nqp4428 10:12AM Richardmike Tania   REPORT COMMENT:  LAB REQ = [id number]  FASTING:YES     Test Name Result Flag Reference   TESTOSTERONE, TOTAL,$LC/MS/ ng/dL L 250-1100   Men with clinically significant hypogonadal  symptoms and testosterone values repeatedly in  the range of the 200-300 ng/dL or less, may  benefit from testosterone treatment after  adequate risk and benefits counseling  For more information on this test, go to  http://education  [employer]/faq/  TotalTestosteroneLCMSMS        This test was developed and its analytical performance  characteristics have been determined by [address] [place] [address]  It has  not been cleared or approved by the [place]  Food and Drug  Administration  This assay has been validated pursuant  to the CLIA regulations and is used for clinical  purposes  FREE TESTOSTERONE 31 2 pg/mL L 35 0-155 0   This test was developed and its analytical performance  characteristics have been determined by [address] [place] [address]  It has  not been cleared or approved by the [place]  Food and Drug  Administration  This assay has been validated pursuant  to the CLIA regulations and is used for clinical  purposes

## 2018-01-16 NOTE — RESULT NOTES
Verified Results  (1) CBC/PLT/DIFF 15PHC0714 10:38AM Shazia Arrow Order Number: [id number]     Test Name Result Flag Reference   WBC COUNT 7 47 Thousand/uL  4 31-10 16   RBC COUNT 5 54 Million/uL  3 88-5 62   HEMOGLOBIN 16 5 g/dL  12 0-17 0   HEMATOCRIT 49 4 % H 36 5-49 3   MCV 89 fL  82-98   MCH 29 8 pg  26 8-34 3   MCHC 33 4 g/dL  31 4-37 4   RDW 14 6 %  11 6-15 1   MPV 11 3 fL  8 9-12 7   PLATELET COUNT 465 Thousands/uL  149-390   nRBC AUTOMATED 0 /100 WBCs     NEUTROPHILS RELATIVE PERCENT 54 %  43-75   LYMPHOCYTES RELATIVE PERCENT 32 %  14-44   MONOCYTES RELATIVE PERCENT 12 %  4-12   EOSINOPHILS RELATIVE PERCENT 2 %  0-6   BASOPHILS RELATIVE PERCENT 0 %  0-1   NEUTROPHILS ABSOLUTE COUNT 4 02 Thousands/? ??L  1 85-7 62   LYMPHOCYTES ABSOLUTE COUNT 2 42 Thousands/? ??L  0 60-4 47   MONOCYTES ABSOLUTE COUNT 0 89 Thousand/? ??L  0 17-1 22   EOSINOPHILS ABSOLUTE COUNT 0 11 Thousand/? ??L  0 00-0 61   BASOPHILS ABSOLUTE COUNT 0 02 Thousands/? ??L  0 00-0 10   - Patient Instructions: This bloodwork is non-fasting  Please drink two glasses of water morning of bloodwork  - Patient Instructions: This bloodwork is non-fasting  Please drink two glasses of water morning of bloodwork  (1) COMPREHENSIVE METABOLIC PANEL 07JRG1042 43:07XD Shazia Arrow Order Number: [id number]     Test Name Result Flag Reference   SODIUM 141 mmol/L  136-145   POTASSIUM 4 0 mmol/L  3 5-5 3   CHLORIDE 105 mmol/L  100-108   CARBON DIOXIDE 31 mmol/L  21-32   ANION GAP (CALC) 5 mmol/L  4-13   BLOOD UREA NITROGEN 13 mg/dL  5-25   CREATININE 0 95 mg/dL  0 60-1 30   Standardized to IDMS reference method   CALCIUM 9 0 mg/dL  8 3-10 1   BILI, TOTAL 0 64 mg/dL  0 20-1 00   ALK PHOSPHATAS 59 U/L     ALT (SGPT) 83 U/L H 12-78   AST(SGOT) 28 U/L  5-45   ALBUMIN 3 9 g/dL  3 5-5 0   TOTAL PROTEIN 7 6 g/dL  6 4-8 2   eGFR Non-African American      >60 0 ml/min/1 73sq m   - Patient Instructions:  This is a fasting blood test  Water,black tea or black  coffee only after 9:00pm the night before test Drink 2 glasses of water the morning of test   National Kidney Disease Education Program recommendations are as follows:  GFR calculation is accurate only with a steady state creatinine  Chronic Kidney disease less than 60 ml/min/1 73 sq  meters  Kidney failure less than 15 ml/min/1 73 sq  meters  GLUCOSE FASTING 79 mg/dL  65-99     (1) LIPID PANEL, FASTING [id number] 10:38AM Peggy Loera Order Number: [id number]     Test Name Result Flag Reference   CHOLESTEROL 128 mg/dL     HDL,DIRECT 36 mg/dL L 40-60   Specimen collection should occur prior to Metamizole administration due to the potential for falsely depressed results  LDL CHOLESTEROL CALCULATED 67 mg/dL  0-100   - Patient Instructions: This is a fasting blood test  Water,black tea or black  coffee only after 9:00pm the night before test   Drink 2 glasses of water the morning of test     - Patient Instructions: This is a fasting blood test  Water,black tea or black  coffee only after 9:00pm the night before test Drink 2 glasses of water the morning of test   Triglyceride:         Normal              <150 mg/dl       Borderline High    150-199 mg/dl       High               200-499 mg/dl       Very High          >499 mg/dl  Cholesterol:         Desirable        <200 mg/dl      Borderline High  200-239 mg/dl      High             >239 mg/dl  HDL Cholesterol:        High    >59 mg/dL      Low     <41 mg/dL  LDL CALCULATED:    This screening LDL is a calculated result  It does not have the accuracy of the Direct Measured LDL in the monitoring of patients with hyperlipidemia and/or statin therapy  Direct Measure LDL (NZX338) must be ordered separately in these patients  TRIGLYCERIDES 127 mg/dL  <=150   Specimen collection should occur prior to N-Acetylcysteine or Metamizole administration due to the potential for falsely depressed results       (1) PSA (SCREEN) (Dx V76 44 Screen for Prostate Cancer) 54PHR7021 10:38AM Mary Zaidiomon Order Number: [id number]     Test Name Result Flag Reference   PROSTATE SPECIFIC ANTIGEN 2 0 ng/mL  0 0-4 0   American Urological Association Guidelines define biochemical recurrence of prostate cancer as a detectable or rising PSA value post-radical prostatectomy that is greater than or equal to 0 2 ng/mL with a second confirmatory level of greater than or equal to 0 2 ng/mL  - Patient Instructions: This test is non-fasting  Please drink two glasses of water morning of bloodwork  - Patient Instructions: This test is non-fasting  Please drink two glasses of water morning of bloodwork  (1) TESTOSTERONE [id number] 10:38AM Mary Shane Order Number: [id number]     Test Name Result Flag Reference   TESTOSTERONE 299 7 ng/dL  875-827   - Patient Instructions: Fasting preferred  Collections for men not undergoing treatment must be completed between 7am-9am ONLY  Collection time restrictions are not applicable to women or men already undergoing treatment  - Patient Instructions: Fasting preferred  Collections for men not undergoing treatment must be completed between 7am-9am ONLY  Collection time restrictions are not applicable to women or men already undergoing treatment  (1) VITAMIN D 25-HYDROXY [id number] 10:38AM Mary Shane Order Number: [id number]     Test Name Result Flag Reference   VIT D 25-HYDROX 46 8 ng/mL  30 0-100 0   This assay is a certified procedure of the CDC Vitamin D Standardization Certification Program (VDSCP)     Deficiency <20ng/ml   Insufficiency 20-30ng/ml   Sufficient  ng/ml     *Patients undergoing fluorescein dye angiography may retain small amounts of fluorescein in the body for 48-72 hours post procedure  Samples containing fluorescein can produce falsely elevated Vitamin D values   If the patient had this procedure, a specimen should be resubmitted post fluorescein clearance

## 2018-01-18 NOTE — RESULT NOTES
Verified Results  (1) IRON SATURATION %, TIBC 59Dxo5403 10:19AM [facility]     Test Name Result Flag Reference   IRON, TOTAL 147 mcg/dL     IRON BINDING CAPACITY 298 mcg/dL (calc)  250-425   % SATURATION 49 % (calc)  15-60     (Q) TISSUE TRANSGLUTAMINASE ANTIBODY, IGA 70Swb3848 10:19AM [facility]     Test Name Result Flag Reference   TISSUE TRANSGLUTAMINASE$ANTIBODY, IGA 1 U/mL     Value      Interpretation         -----      --------------         <4         No Antibody Detected         > or = 4   Antibody Detected     (Q) MICHAEL IFA SCREEN W/REFL TO TITER AND PATTERN, IFA [id number] 10:19AM [facility]     Test Name Result Flag Reference   MICHAEL SCREEN, IFA NEGATIVE  NEGATIVE     (Q) MITOCHONDRIAL ANTIBODY W/REFL TITER [id number] 10:19AM [facility]     Test Name Result Flag Reference   MITOCHONDRIAL AB SCREEN NEGATIVE  NEGATIVE     (Q) SMOOTH MUSCLE AB W/REFL TITER [id number] 10:19AM [facility]     Test Name Result Flag Reference   SMOOTH MUSCLE AB SCREEN NEGATIVE  NEGATIVE     (Q) HEPATITIS PANEL [id number] 10:19AM [facility]     Test Name Result Flag Reference   HEPATITIS A AB, TOTAL NON-REACTIVE  NON-REACTIVE   HEPATITIS B SURFACE$ANTIBODY QL NON-REACTIVE  NON-REACTIVE   HEPATITIS B SURFACE$ANTIGEN NON-REACTIVE  NON-REACTIVE   HEPATITIS B CORE AB TOTAL NON-REACTIVE  NON-REACTIVE   HEPATITIS C ANTIBODY NON-REACTIVE  NON-REACTIVE   SIGNAL TO CUT-OFF 0 02  <1 00     (1) OP GERBER FERRITIN 50Yil9947 10:19AM [facility]     Test Name Result Flag Reference   FERRITIN 137 ng/mL       (Q) TSH, 3RD GENERATION [id number] 10:19AM [facility]   REPORT COMMENT:  FASTING:NO     Test Name Result Flag Reference   TSH 1 20 mIU/L  0 40-4 50

## 2018-03-15 ENCOUNTER — TELEPHONE (OUTPATIENT)
Dept: UROLOGY | Facility: AMBULATORY SURGERY CENTER | Age: 63
End: 2018-03-15

## 2018-03-15 NOTE — TELEPHONE ENCOUNTER
Patient called office asking for another script of Androgel since he misplaced the one Dr Tom Parr gave to him at office visit in November 2017  Patient had not filled it because he wanted to try something more "natural" before Androgel  Advised patient will have to discuss with Dr Tom Parr since patient is actually due for 6 month f/u now with blood work, and see if Dr Tom Parr wants to do blood work before patient starting Androgel  Advised patient will check with Dr Tom Parr and will call him back

## 2018-03-22 DIAGNOSIS — R79.89 LOW TESTOSTERONE: Primary | ICD-10-CM

## 2018-03-22 NOTE — TELEPHONE ENCOUNTER
Spoke with PT and scheduled with Tammy Pereira in [facility] for 4/5/2018  I mailed PT his scripts that were in his chart which were for a testosterone, cbc, and cmp  I also mailed appt card and directions

## 2018-03-22 NOTE — TELEPHONE ENCOUNTER
Per Dr Barton Mess needs a testosterone level and then to f/u with [facility] practicioner   Please s/x for next available order for testosterone placed

## 2018-04-02 LAB
ALBUMIN SERPL-MCNC: 4.5 G/DL (ref 3.6–5.1)
ALBUMIN/GLOB SERPL: 1.7 (CALC) (ref 1–2.5)
ALP SERPL-CCNC: 66 U/L (ref 40–115)
ALT SERPL-CCNC: 64 U/L (ref 9–46)
AST SERPL-CCNC: 25 U/L (ref 10–35)
BASOPHILS # BLD AUTO: 31 CELLS/UL (ref 0–200)
BASOPHILS NFR BLD AUTO: 0.5 %
BILIRUB SERPL-MCNC: 0.7 MG/DL (ref 0.2–1.2)
BUN SERPL-MCNC: 15 MG/DL (ref 7–25)
BUN/CREAT SERPL: ABNORMAL (CALC) (ref 6–22)
CALCIUM SERPL-MCNC: 9.7 MG/DL (ref 8.6–10.3)
CHLORIDE SERPL-SCNC: 107 MMOL/L (ref 98–110)
CO2 SERPL-SCNC: 27 MMOL/L (ref 20–31)
CREAT SERPL-MCNC: 0.86 MG/DL (ref 0.7–1.25)
EOSINOPHIL # BLD AUTO: 161 CELLS/UL (ref 15–500)
EOSINOPHIL NFR BLD AUTO: 2.6 %
ERYTHROCYTE [DISTWIDTH] IN BLOOD BY AUTOMATED COUNT: 13.7 % (ref 11–15)
GLOBULIN SER CALC-MCNC: 2.7 G/DL (CALC) (ref 1.9–3.7)
GLUCOSE SERPL-MCNC: 87 MG/DL (ref 65–99)
HCT VFR BLD AUTO: 44.6 % (ref 38.5–50)
HGB BLD-MCNC: 14.7 G/DL (ref 13.2–17.1)
LYMPHOCYTES # BLD AUTO: 2145 CELLS/UL (ref 850–3900)
LYMPHOCYTES NFR BLD AUTO: 34.6 %
MCH RBC QN AUTO: 28.8 PG (ref 27–33)
MCHC RBC AUTO-ENTMCNC: 33 G/DL (ref 32–36)
MCV RBC AUTO: 87.3 FL (ref 80–100)
MONOCYTES # BLD AUTO: 794 CELLS/UL (ref 200–950)
MONOCYTES NFR BLD AUTO: 12.8 %
NEUTROPHILS # BLD AUTO: 3069 CELLS/UL (ref 1500–7800)
NEUTROPHILS NFR BLD AUTO: 49.5 %
PLATELET # BLD AUTO: 291 THOUSAND/UL (ref 140–400)
PMV BLD REES-ECKER: 10.8 FL (ref 7.5–12.5)
POTASSIUM SERPL-SCNC: 4.1 MMOL/L (ref 3.5–5.3)
PROT SERPL-MCNC: 7.2 G/DL (ref 6.1–8.1)
RBC # BLD AUTO: 5.11 MILLION/UL (ref 4.2–5.8)
SL AMB EGFR AFRICAN AMERICAN: 108 ML/MIN/1.73M2
SL AMB EGFR NON AFRICAN AMERICAN: 93 ML/MIN/1.73M2
SODIUM SERPL-SCNC: 141 MMOL/L (ref 135–146)
TESTOST FREE SERPL-MCNC: 48.4 PG/ML (ref 35–155)
TESTOST SERPL-MCNC: 259 NG/DL (ref 250–1100)
WBC # BLD AUTO: 6.2 THOUSAND/UL (ref 3.8–10.8)

## 2018-04-04 RX ORDER — RIBOFLAVIN (VITAMIN B2) 100 MG
TABLET ORAL
COMMUNITY
End: 2018-05-23

## 2018-04-04 RX ORDER — VITAMIN E 268 MG
CAPSULE ORAL
COMMUNITY

## 2018-04-04 RX ORDER — TESTOSTERONE 16.2 MG/G
GEL TRANSDERMAL DAILY
COMMUNITY
Start: 2017-11-07 | End: 2018-04-05 | Stop reason: SDUPTHER

## 2018-04-04 RX ORDER — DIMENHYDRINATE 50 MG
TABLET ORAL
COMMUNITY
End: 2018-09-10

## 2018-04-04 RX ORDER — CALCIUM CARBONATE/VITAMIN D3 500-10/5ML
LIQUID (ML) ORAL
COMMUNITY
End: 2018-09-10

## 2018-04-04 RX ORDER — MULTIVITAMIN/IRON/FOLIC ACID 18MG-0.4MG
TABLET ORAL
COMMUNITY

## 2018-04-04 RX ORDER — PERPHENAZINE/AMITRIPTYLINE HCL 2 MG-25 MG
TABLET ORAL
COMMUNITY
End: 2018-05-23

## 2018-04-04 RX ORDER — OLMESARTAN MEDOXOMIL AND HYDROCHLOROTHIAZIDE 20/12.5 20; 12.5 MG/1; MG/1
1 TABLET ORAL DAILY
COMMUNITY
Start: 2011-03-15 | End: 2018-04-23 | Stop reason: SDUPTHER

## 2018-04-04 RX ORDER — SILDENAFIL CITRATE 20 MG/1
TABLET ORAL
COMMUNITY
Start: 2017-03-22 | End: 2018-05-23

## 2018-04-04 RX ORDER — MULTIVITAMIN WITH IRON
TABLET ORAL
COMMUNITY
End: 2018-09-10

## 2018-04-04 RX ORDER — FLAXSEED OIL 1000 MG
CAPSULE ORAL
COMMUNITY
End: 2018-05-23

## 2018-04-05 ENCOUNTER — PROCEDURE VISIT (OUTPATIENT)
Dept: UROLOGY | Facility: CLINIC | Age: 63
End: 2018-04-05
Payer: COMMERCIAL

## 2018-04-05 VITALS
HEART RATE: 64 BPM | DIASTOLIC BLOOD PRESSURE: 100 MMHG | BODY MASS INDEX: 34.53 KG/M2 | WEIGHT: 220 LBS | SYSTOLIC BLOOD PRESSURE: 160 MMHG | HEIGHT: 67 IN

## 2018-04-05 DIAGNOSIS — Z12.5 PROSTATE CANCER SCREENING: ICD-10-CM

## 2018-04-05 DIAGNOSIS — R79.89 LOW TESTOSTERONE: Primary | ICD-10-CM

## 2018-04-05 PROCEDURE — 99213 OFFICE O/P EST LOW 20 MIN: CPT | Performed by: PHYSICIAN ASSISTANT

## 2018-04-05 RX ORDER — TESTOSTERONE 16.2 MG/G
20.25 GEL TRANSDERMAL DAILY
Qty: 2 ACTUATION | Refills: 5 | Status: SHIPPED | OUTPATIENT
Start: 2018-04-05 | End: 2018-09-10 | Stop reason: DRUGHIGH

## 2018-04-05 NOTE — PROGRESS NOTES
1  Low testosterone  CBC and Platelet    Testosterone, free, total    testosterone (ANDROGEL PUMP) 1 62 % TD gel pump    PSA   2  Prostate cancer screening  PSA         Assessment and plan:       1  Low testosterone -- managed by Dr India Ross  - encouraged patient to strive for increased cardiovascular exercise and weight loss  - patient wishes to continue with Androgel  Prescription provided in the office today  - side effect profile reviewed  - he will follow up in 6 months with testosterone, CBC, and PSA prior to visit  - all questions answered  2  Erectile dysfunction  - refill Rx provided for sildenafil 20mg PRN   - side effect profile reviewed     [facility] [place], PA-C      Chief Complaint     F/u low testosterone    History of Present Illness     Jennifer Roseanna is a 58 y o  male patient of Dr India Ross with a history of erectile dysfunction and low testosterone presenting for follow up  Patient had been seen with Dr India Ross in consultation 11/7/17  He had been on testosterone replacement and taking generic sildenafil for ED  Patient was prescribed Androgel 1 62% two pumps once daily, however states he never picked up the medication  He does admit to fatigue and decreased libido  He has been working at improving his sleeping patterns  He does admit to being increasingly sedentary however  His most recent hematocrit stable at 60 3%, metabolic panel WNL, and total testosterone at 259 and free testosterone at 48 4  Comfortable with his urination  Denies dysuria, gross hematuria, nocturia, or urinary infections  Laboratory     Lab Results   Component Value Date    CREATININE 0 86 03/30/2018       Lab Results   Component Value Date    PSA 2 0 03/23/2017    PSA 1 4 12/17/2015       Review of Systems     Review of Systems   Constitutional: Negative for activity change, appetite change, chills, diaphoresis, fatigue, fever and unexpected weight change     Respiratory: Negative for chest tightness and shortness of breath  Cardiovascular: Negative for chest pain, palpitations and leg swelling  Gastrointestinal: Negative for abdominal distention, abdominal pain, constipation, diarrhea, nausea and vomiting  Genitourinary: Negative for decreased urine volume, difficulty urinating, dysuria, enuresis, flank pain, frequency, genital sores, hematuria and urgency  Musculoskeletal: Negative for back pain, gait problem and myalgias  Skin: Negative for color change, pallor, rash and wound  Psychiatric/Behavioral: Negative for behavioral problems  The patient is not nervous/anxious  Allergies     No Known Allergies    Physical Exam     Physical Exam   Constitutional: He is oriented to person, place, and time  He appears well-developed and well-nourished  No distress  HENT:   Head: Normocephalic and atraumatic  Eyes: Conjunctivae are normal    Neck: Normal range of motion  No tracheal deviation present  Pulmonary/Chest: Effort normal    Musculoskeletal: Normal range of motion  He exhibits no edema or deformity  Neurological: He is alert and oriented to person, place, and time  Skin: Skin is warm and dry  He is not diaphoretic  Psychiatric: He has a normal mood and affect   His behavior is normal          Vital Signs     Vitals:    04/05/18 1026   BP: 160/100   Pulse: 64   Weight: 99 8 kg (220 lb)   Height: 5' 7" (1 702 m)         Current Medications       Current Outpatient Prescriptions:     Apple Cider Vinegar 300 MG TABS, Take by mouth, Disp: , Rfl:     APPLE CIDER VINEGAR PO, Take by mouth, Disp: , Rfl:     Ascorbic Acid (VITAMIN C) 100 MG tablet, Take 100 mg by mouth daily, Disp: , Rfl:     Calcium Carb-Cholecalciferol (CALCIUM 1000 + D) 1000-800 MG-UNIT TABS, Take by mouth, Disp: , Rfl:     Calcium Carbonate-Vit D-Min (CALCIUM 1200 PO), Take by mouth, Disp: , Rfl:     Cholecalciferol (CVS D3) 5000 units capsule, Take 5,000 Units by mouth daily, Disp: , Rfl:    Cholecalciferol (CVS D3) 5000 units capsule, Take 1 capsule by mouth daily, Disp: , Rfl:     co-enzyme Q-10 30 MG capsule, Take 30 mg by mouth 3 (three) times a day, Disp: , Rfl:     CREATINE PO, Take by mouth, Disp: , Rfl:     cyanocobalamin (VITAMIN B-12) 1,000 mcg tablet, Take by mouth daily, Disp: , Rfl:     Flaxseed, Linseed, (FLAX SEED OIL PO), Take by mouth, Disp: , Rfl:     GARLIC 0179 PO, Take by mouth, Disp: , Rfl:     Glucosamine-Chondroitin 8424-4046 MG/30ML LIQD, Take by mouth, Disp: , Rfl:     Linoleic Acid-Sunflower Oil (CLA) 500-1000 MG CAPS, Take by mouth, Disp: , Rfl:     Magnesium 100 MG TABS, Take by mouth, Disp: , Rfl:     Milk Thistle 1000 MG CAPS, Take by mouth, Disp: , Rfl:     Multiple Vitamin-Folic Acid TABS, Take by mouth, Disp: , Rfl:     Nutritional Supplements (CREATINE) 750 MG CAPS, Take by mouth, Disp: , Rfl:     olmesartan-hydrochlorothiazide (BENICAR HCT) 20-12 5 MG per tablet, Take 1 tablet by mouth daily, Disp: , Rfl:     pantoprazole (PROTONIX) 40 mg tablet, Take 40 mg by mouth daily, Disp: , Rfl:     progesterone (PROMETRIUM) 100 MG capsule, Take 100 mg by mouth daily, Disp: , Rfl:     pyridoxine (VITAMIN B6) 100 mg tablet, Take 100 mg by mouth daily, Disp: , Rfl:     Selenium 200 MCG CAPS, Take by mouth, Disp: , Rfl:     sildenafil (REVATIO) 20 mg tablet, Take 20 mg by mouth 3 (three) times a day, Disp: , Rfl:     testosterone (ANDROGEL PUMP) 1 62 % TD gel pump, Apply 1 actuation (20 25 mg total) topically daily, Disp: 2 actuation, Rfl: 5    verapamil (CALAN-SR) 180 mg CR tablet, Take 180 mg by mouth daily at bedtime, Disp: , Rfl:     verapamil (CALAN-SR) 180 mg CR tablet, Take 1 tablet by mouth daily, Disp: , Rfl:     vitamin E, tocopherol, 400 units capsule, Take 400 Units by mouth daily, Disp: , Rfl:     Zinc 30 MG CAPS, Take by mouth, Disp: , Rfl:     Ascorbic Acid (VITAMIN C) 100 MG tablet, Take by mouth, Disp: , Rfl:     coenzyme Q-10 100 MG capsule, Take by mouth, Disp: , Rfl:     Cyanocobalamin (VITAMIN B12) 1000 MCG TBCR, Take by mouth, Disp: , Rfl:     Flaxseed, Linseed, (FLAX SEED OIL) 1300 MG CAPS, Take by mouth, Disp: , Rfl:     Garlic 492 MG TABS, Take by mouth, Disp: , Rfl:     glucosamine-chondroitin 500-400 MG tablet, Take 1 tablet by mouth 3 (three) times a day, Disp: , Rfl:     Linoleic Acid-Sunflower Oil (CLA) 500-1000 MG CAPS, Take by mouth, Disp: , Rfl:     Magnesium Gluconate (MAGNESIUM 27 PO), Take by mouth, Disp: , Rfl:     milk thistle 175 MG tablet, Take 175 mg by mouth daily, Disp: , Rfl:     multivitamin (THERAGRAN) TABS, Take 1 tablet by mouth daily, Disp: , Rfl:     olmesartan-hydrochlorothiazide (BENICAR HCT) 20-12 5 MG per tablet, Take 1 tablet by mouth daily, Disp: , Rfl:     pyridoxine (VITAMIN B6) 100 mg tablet, Take by mouth, Disp: , Rfl:     Selenium 200 MCG CAPS, Take by mouth, Disp: , Rfl:     sildenafil (REVATIO) 20 mg tablet, Take by mouth, Disp: , Rfl:     Testosterone 10 MG/ACT (2%) GEL, Place on the skin, Disp: , Rfl:     vitamin E, tocopherol, 400 units capsule, Take by mouth, Disp: , Rfl:     Zinc Sulfate (ZINC 15 PO), Take by mouth, Disp: , Rfl:       Active Problems     There is no problem list on file for this patient  Past Medical History     Past Medical History:   Diagnosis Date    Sutherland's esophagus     Elevated liver function tests     GERD (gastroesophageal reflux disease)     Hypertension          Surgical History     Past Surgical History:   Procedure Laterality Date    BONY PELVIS SURGERY      HERNIA REPAIR      DE ESOPHAGOGASTRODUODENOSCOPY TRANSORAL DIAGNOSTIC N/A 10/18/2017    Procedure: ESOPHAGOGASTRODUODENOSCOPY (EGD); Surgeon: Sherry Tavares MD;  Location: [facility]; Service: Gastroenterology         Family History     History reviewed  No pertinent family history        Social History     Social History       Radiology

## 2018-04-21 DIAGNOSIS — I10 ESSENTIAL HYPERTENSION: Primary | ICD-10-CM

## 2018-04-23 RX ORDER — OLMESARTAN MEDOXOMIL AND HYDROCHLOROTHIAZIDE 20/12.5 20; 12.5 MG/1; MG/1
TABLET ORAL
Qty: 90 TABLET | Refills: 0 | Status: SHIPPED | OUTPATIENT
Start: 2018-04-23 | End: 2018-08-22 | Stop reason: SDUPTHER

## 2018-04-24 ENCOUNTER — APPOINTMENT (OUTPATIENT)
Dept: RADIOLOGY | Facility: MEDICAL CENTER | Age: 63
End: 2018-04-24
Payer: COMMERCIAL

## 2018-04-24 ENCOUNTER — OFFICE VISIT (OUTPATIENT)
Dept: FAMILY MEDICINE CLINIC | Facility: CLINIC | Age: 63
End: 2018-04-24
Payer: COMMERCIAL

## 2018-04-24 VITALS
HEIGHT: 66 IN | RESPIRATION RATE: 16 BRPM | BODY MASS INDEX: 35.68 KG/M2 | OXYGEN SATURATION: 98 % | HEART RATE: 69 BPM | TEMPERATURE: 97.8 F | WEIGHT: 222 LBS | SYSTOLIC BLOOD PRESSURE: 142 MMHG | DIASTOLIC BLOOD PRESSURE: 94 MMHG

## 2018-04-24 DIAGNOSIS — R79.89 ELEVATED LIVER FUNCTION TESTS: ICD-10-CM

## 2018-04-24 DIAGNOSIS — M25.561 PAIN IN BOTH KNEES, UNSPECIFIED CHRONICITY: Primary | ICD-10-CM

## 2018-04-24 DIAGNOSIS — M25.562 PAIN IN BOTH KNEES, UNSPECIFIED CHRONICITY: Primary | ICD-10-CM

## 2018-04-24 DIAGNOSIS — I10 BENIGN ESSENTIAL HYPERTENSION: ICD-10-CM

## 2018-04-24 DIAGNOSIS — M25.561 PAIN IN BOTH KNEES, UNSPECIFIED CHRONICITY: ICD-10-CM

## 2018-04-24 DIAGNOSIS — M25.562 PAIN IN BOTH KNEES, UNSPECIFIED CHRONICITY: ICD-10-CM

## 2018-04-24 DIAGNOSIS — R79.89 LOW TESTOSTERONE: ICD-10-CM

## 2018-04-24 DIAGNOSIS — Z12.5 SCREENING FOR PROSTATE CANCER: ICD-10-CM

## 2018-04-24 PROCEDURE — 73562 X-RAY EXAM OF KNEE 3: CPT

## 2018-04-24 PROCEDURE — 99214 OFFICE O/P EST MOD 30 MIN: CPT | Performed by: INTERNAL MEDICINE

## 2018-04-24 PROCEDURE — 73564 X-RAY EXAM KNEE 4 OR MORE: CPT

## 2018-04-24 RX ORDER — AMOXICILLIN 500 MG/1
500 CAPSULE ORAL EVERY 8 HOURS SCHEDULED
Qty: 30 CAPSULE | Refills: 0 | Status: SHIPPED | OUTPATIENT
Start: 2018-04-24 | End: 2018-05-04

## 2018-04-24 NOTE — PROGRESS NOTES
Assessment/Plan:         Diagnoses and all orders for this visit:    Pain in both knees, unspecified chronicity  Comments:  discussed try aleve 2 tabs bid with food  also rec pepcid or prilosec for gi protection    Screening for prostate cancer  -     PSA, total and free; Future    Benign essential hypertension  Comments:  rto 1-2 months for bp recheck and consider increase med    Elevated liver function tests  Comments:  suspect fatty liver  check u/s and hept studies          Subjective:      Patient ID: Bryan Alarcon is a 58 y o  male  Pt relates ill since Friday +running nose, +sore throat  Using otc zinc   +nausea and loose stools  Also his b/l knees hurt  He had a knee injury years ago  He tried betzy emu  Knee Pain          The following portions of the patient's history were reviewed and updated as appropriate:   He  has a past medical history of Sutherland's esophagus; Elevated liver function tests; GERD (gastroesophageal reflux disease); and Hypertension  He   Patient Active Problem List    Diagnosis Date Noted    GERD without esophagitis 09/14/2016    Elevated liver function tests 03/09/2016    Benign essential hypertension 04/29/2015     He  has a past surgical history that includes Hernia repair; Bony pelvis surgery; and pr esophagogastroduodenoscopy transoral diagnostic (N/A, 10/18/2017)  His family history includes Stroke in his father  He  reports that he has never smoked  He has never used smokeless tobacco  He reports that he does not drink alcohol or use drugs    Current Outpatient Prescriptions   Medication Sig Dispense Refill    Apple Cider Vinegar 300 MG TABS Take by mouth      Ascorbic Acid (VITAMIN C) 100 MG tablet Take 100 mg by mouth daily      Calcium Carb-Cholecalciferol (CALCIUM 1000 + D) 1000-800 MG-UNIT TABS Take by mouth      Cholecalciferol (CVS D3) 5000 units capsule Take 5,000 Units by mouth daily      coenzyme Q-10 100 MG capsule Take by mouth      CREATINE PO Take by mouth      cyanocobalamin (VITAMIN B-12) 1,000 mcg tablet Take by mouth daily      Flaxseed, Linseed, (FLAX SEED OIL PO) Take by mouth      GARLIC 0422 PO Take by mouth      Glucosamine-Chondroitin 5015-0744 MG/30ML LIQD Take by mouth      Linoleic Acid-Sunflower Oil (CLA) 500-1000 MG CAPS Take by mouth      Magnesium 100 MG TABS Take by mouth      Milk Thistle 1000 MG CAPS Take by mouth      Multiple Vitamin-Folic Acid TABS Take by mouth      olmesartan-hydrochlorothiazide (BENICAR HCT) 20-12 5 MG per tablet TAKE 1 TABLET DAILY   90 tablet 0    pyridoxine (VITAMIN B6) 100 mg tablet Take 100 mg by mouth daily      Selenium 200 MCG CAPS Take by mouth      sildenafil (REVATIO) 20 mg tablet Take 20 mg by mouth 3 (three) times a day      testosterone (ANDROGEL PUMP) 1 62 % TD gel pump Apply 1 actuation (20 25 mg total) topically daily 2 actuation 5    verapamil (CALAN-SR) 180 mg CR tablet Take 180 mg by mouth daily at bedtime      vitamin E, tocopherol, 400 units capsule Take 400 Units by mouth daily      Zinc 30 MG CAPS Take by mouth      APPLE CIDER VINEGAR PO Take by mouth      Ascorbic Acid (VITAMIN C) 100 MG tablet Take by mouth      Calcium Carbonate-Vit D-Min (CALCIUM 1200 PO) Take by mouth      Cholecalciferol (CVS D3) 5000 units capsule Take 1 capsule by mouth daily      co-enzyme Q-10 30 MG capsule Take 30 mg by mouth 3 (three) times a day      Cyanocobalamin (VITAMIN B12) 1000 MCG TBCR Take by mouth      Flaxseed, Linseed, (FLAX SEED OIL) 1300 MG CAPS Take by mouth      Garlic 405 MG TABS Take by mouth      glucosamine-chondroitin 500-400 MG tablet Take 1 tablet by mouth 3 (three) times a day      Linoleic Acid-Sunflower Oil (CLA) 500-1000 MG CAPS Take by mouth      Magnesium Gluconate (MAGNESIUM 27 PO) Take by mouth      milk thistle 175 MG tablet Take 175 mg by mouth daily      multivitamin (THERAGRAN) TABS Take 1 tablet by mouth daily      Nutritional Supplements (CREATINE) 750 MG CAPS Take by mouth      progesterone (PROMETRIUM) 100 MG capsule Take 100 mg by mouth daily      pyridoxine (VITAMIN B6) 100 mg tablet Take by mouth      Selenium 200 MCG CAPS Take by mouth      sildenafil (REVATIO) 20 mg tablet Take by mouth      Testosterone 10 MG/ACT (2%) GEL Place on the skin      verapamil (CALAN-SR) 180 mg CR tablet Take 1 tablet by mouth daily      vitamin E, tocopherol, 400 units capsule Take by mouth      Zinc Sulfate (ZINC 15 PO) Take by mouth       No current facility-administered medications for this visit  Current Outpatient Prescriptions on File Prior to Visit   Medication Sig    Apple Cider Vinegar 300 MG TABS Take by mouth    Ascorbic Acid (VITAMIN C) 100 MG tablet Take 100 mg by mouth daily    Calcium Carb-Cholecalciferol (CALCIUM 1000 + D) 1000-800 MG-UNIT TABS Take by mouth    Cholecalciferol (CVS D3) 5000 units capsule Take 5,000 Units by mouth daily    coenzyme Q-10 100 MG capsule Take by mouth    CREATINE PO Take by mouth    cyanocobalamin (VITAMIN B-12) 1,000 mcg tablet Take by mouth daily    Flaxseed, Linseed, (FLAX SEED OIL PO) Take by mouth    GARLIC 5187 PO Take by mouth    Glucosamine-Chondroitin 8686-7787 MG/30ML LIQD Take by mouth    Linoleic Acid-Sunflower Oil (CLA) 500-1000 MG CAPS Take by mouth    Magnesium 100 MG TABS Take by mouth    Milk Thistle 1000 MG CAPS Take by mouth    Multiple Vitamin-Folic Acid TABS Take by mouth    olmesartan-hydrochlorothiazide (BENICAR HCT) 20-12 5 MG per tablet TAKE 1 TABLET DAILY      pyridoxine (VITAMIN B6) 100 mg tablet Take 100 mg by mouth daily    Selenium 200 MCG CAPS Take by mouth    sildenafil (REVATIO) 20 mg tablet Take 20 mg by mouth 3 (three) times a day    testosterone (ANDROGEL PUMP) 1 62 % TD gel pump Apply 1 actuation (20 25 mg total) topically daily    verapamil (CALAN-SR) 180 mg CR tablet Take 180 mg by mouth daily at bedtime    vitamin E, tocopherol, 400 units capsule Take 400 Units by mouth daily    Zinc 30 MG CAPS Take by mouth    APPLE CIDER VINEGAR PO Take by mouth    Ascorbic Acid (VITAMIN C) 100 MG tablet Take by mouth    Calcium Carbonate-Vit D-Min (CALCIUM 1200 PO) Take by mouth    Cholecalciferol (CVS D3) 5000 units capsule Take 1 capsule by mouth daily    co-enzyme Q-10 30 MG capsule Take 30 mg by mouth 3 (three) times a day    Cyanocobalamin (VITAMIN B12) 1000 MCG TBCR Take by mouth    Flaxseed, Linseed, (FLAX SEED OIL) 1300 MG CAPS Take by mouth    Garlic 928 MG TABS Take by mouth    glucosamine-chondroitin 500-400 MG tablet Take 1 tablet by mouth 3 (three) times a day    Linoleic Acid-Sunflower Oil (CLA) 500-1000 MG CAPS Take by mouth    Magnesium Gluconate (MAGNESIUM 27 PO) Take by mouth    milk thistle 175 MG tablet Take 175 mg by mouth daily    multivitamin (THERAGRAN) TABS Take 1 tablet by mouth daily    Nutritional Supplements (CREATINE) 750 MG CAPS Take by mouth    progesterone (PROMETRIUM) 100 MG capsule Take 100 mg by mouth daily    pyridoxine (VITAMIN B6) 100 mg tablet Take by mouth    Selenium 200 MCG CAPS Take by mouth    sildenafil (REVATIO) 20 mg tablet Take by mouth    Testosterone 10 MG/ACT (2%) GEL Place on the skin    verapamil (CALAN-SR) 180 mg CR tablet Take 1 tablet by mouth daily    vitamin E, tocopherol, 400 units capsule Take by mouth    Zinc Sulfate (ZINC 15 PO) Take by mouth    [DISCONTINUED] pantoprazole (PROTONIX) 40 mg tablet Take 40 mg by mouth daily     No current facility-administered medications on file prior to visit  He has No Known Allergies       Review of Systems   Constitutional: Negative  HENT: Positive for postnasal drip and sore throat  Respiratory: Positive for cough  Cardiovascular: Negative  Neurological: Positive for headaches           Objective:      /94 (BP Location: Left arm, Patient Position: Sitting, Cuff Size: Large)   Pulse 69   Temp 97 8 °F (36 6 °C) (Tympanic) Resp 16   Ht 5' 6" (1 676 m)   Wt 101 kg (222 lb)   SpO2 98%   BMI 35 83 kg/m²          Physical Exam   Constitutional: He appears well-developed and well-nourished  HENT:   Head: Normocephalic and atraumatic  Right Ear: External ear normal    Left Ear: External ear normal    +pharyngeal injection   Neck: Normal range of motion  Neck supple  Cardiovascular: Normal rate and regular rhythm  Pulmonary/Chest: Effort normal and breath sounds normal    Abdominal: Soft   Bowel sounds are normal

## 2018-04-24 NOTE — LETTER
April 24, 2018     Patient: Oxana Freire   YOB: 1955   Date of Visit: 4/24/2018       To Whom it May Concern:    Oxana Freire is under my professional care  He was seen in my office on 4/24/2018  He may return to work on 4/25/18  If you have any questions or concerns, please don't hesitate to call           Sincerely,          Monika Agustin DO        CC: No Recipients

## 2018-05-04 ENCOUNTER — HOSPITAL ENCOUNTER (OUTPATIENT)
Dept: ULTRASOUND IMAGING | Facility: HOSPITAL | Age: 63
Discharge: HOME | End: 2018-05-04
Payer: COMMERCIAL

## 2018-05-04 DIAGNOSIS — M25.562 PAIN IN BOTH KNEES, UNSPECIFIED CHRONICITY: Primary | ICD-10-CM

## 2018-05-04 DIAGNOSIS — M25.561 PAIN IN BOTH KNEES, UNSPECIFIED CHRONICITY: Primary | ICD-10-CM

## 2018-05-04 DIAGNOSIS — R79.89 ELEVATED LIVER FUNCTION TESTS: ICD-10-CM

## 2018-05-04 PROCEDURE — 76705 ECHO EXAM OF ABDOMEN: CPT

## 2018-05-07 DIAGNOSIS — E29.1 TESTICULAR HYPOFUNCTION: ICD-10-CM

## 2018-05-07 LAB
ALBUMIN SERPL-MCNC: 4.6 G/DL (ref 3.6–5.1)
ALBUMIN/GLOB SERPL: 1.8 (CALC) (ref 1–2.5)
ALP SERPL-CCNC: 79 U/L (ref 40–115)
ALT SERPL-CCNC: 85 U/L (ref 9–46)
AST SERPL-CCNC: 29 U/L (ref 10–35)
BILIRUB DIRECT SERPL-MCNC: 0.1 MG/DL
BILIRUB INDIRECT SERPL-MCNC: 0.6 MG/DL (CALC) (ref 0.2–1.2)
BILIRUB SERPL-MCNC: 0.7 MG/DL (ref 0.2–1.2)
GLOBULIN SER CALC-MCNC: 2.6 G/DL (CALC) (ref 1.9–3.7)
HBV SURFACE AB SER QL IA: NORMAL
HBV SURFACE AG SERPL QL IA: NORMAL
HCV AB S/CO SERPL IA: 0.01
HCV AB SERPL QL IA: NORMAL
PROT SERPL-MCNC: 7.2 G/DL (ref 6.1–8.1)
PSA FREE MFR SERPL: 20 % (CALC)
PSA FREE SERPL-MCNC: 0.3 NG/ML
PSA SERPL-MCNC: 1.5 NG/ML

## 2018-06-27 ENCOUNTER — OFFICE VISIT (OUTPATIENT)
Dept: FAMILY MEDICINE CLINIC | Facility: CLINIC | Age: 63
End: 2018-06-27
Payer: COMMERCIAL

## 2018-06-27 ENCOUNTER — APPOINTMENT (OUTPATIENT)
Dept: RADIOLOGY | Facility: MEDICAL CENTER | Age: 63
End: 2018-06-27
Payer: COMMERCIAL

## 2018-06-27 VITALS
HEART RATE: 78 BPM | OXYGEN SATURATION: 97 % | DIASTOLIC BLOOD PRESSURE: 82 MMHG | BODY MASS INDEX: 34.23 KG/M2 | TEMPERATURE: 97.9 F | HEIGHT: 66 IN | SYSTOLIC BLOOD PRESSURE: 130 MMHG | WEIGHT: 213 LBS

## 2018-06-27 DIAGNOSIS — S46.912A SHOULDER STRAIN, LEFT, INITIAL ENCOUNTER: ICD-10-CM

## 2018-06-27 DIAGNOSIS — I10 BENIGN ESSENTIAL HYPERTENSION: Primary | ICD-10-CM

## 2018-06-27 DIAGNOSIS — K76.0 FATTY LIVER: ICD-10-CM

## 2018-06-27 PROCEDURE — 73030 X-RAY EXAM OF SHOULDER: CPT

## 2018-06-27 PROCEDURE — 99214 OFFICE O/P EST MOD 30 MIN: CPT | Performed by: INTERNAL MEDICINE

## 2018-06-27 RX ORDER — MELOXICAM 15 MG/1
15 TABLET ORAL DAILY
Qty: 30 TABLET | Refills: 2 | Status: SHIPPED | OUTPATIENT
Start: 2018-06-27 | End: 2018-09-10

## 2018-06-27 NOTE — PROGRESS NOTES
Assessment/Plan:         Diagnoses and all orders for this visit:    Benign essential hypertension    Shoulder strain, left, initial encounter  -     meloxicam (MOBIC) 15 mg tablet; Take 1 tablet (15 mg total) by mouth daily  -     XR shoulder 2+ vw left; Future    Fatty liver          Subjective:      Patient ID: Leatha Richards is a 58 y o  male  Pt pulled at work and strained his left shoulder  The following portions of the patient's history were reviewed and updated as appropriate:   He  has a past medical history of Sutherland's esophagus; Elevated liver function tests; GERD (gastroesophageal reflux disease); Helicobacter pylori (H  pylori) infection; Hypertension; and Lyme disease  He   Patient Active Problem List    Diagnosis Date Noted    GERD without esophagitis 09/14/2016    Elevated liver function tests 03/09/2016    Benign essential hypertension 04/29/2015     He  has a past surgical history that includes Hernia repair; Bony pelvis surgery; and pr esophagogastroduodenoscopy transoral diagnostic (N/A, 10/18/2017)  His family history includes Hypotension in his mother; Intracerebral hemorrhage in his father; Stroke in his father  He  reports that he has never smoked  He has never used smokeless tobacco  He reports that he does not drink alcohol or use drugs    Current Outpatient Prescriptions   Medication Sig Dispense Refill    Apple Cider Vinegar 300 MG TABS Take by mouth      Ascorbic Acid (VITAMIN C) 100 MG tablet Take 100 mg by mouth daily      Calcium Carb-Cholecalciferol (CALCIUM 1000 + D) 1000-800 MG-UNIT TABS Take by mouth      Cholecalciferol (CVS D3) 5000 units capsule Take 1 capsule by mouth daily      coenzyme Q-10 100 MG capsule Take by mouth      CREATINE PO Take by mouth      cyanocobalamin (VITAMIN B-12) 1,000 mcg tablet Take by mouth daily      Flaxseed, Linseed, (FLAX SEED OIL PO) Take by mouth      GARLIC 7195 PO Take by mouth      Glucosamine-Chondroitin 4904-0359 MG/30ML LIQD Take by mouth      Linoleic Acid-Sunflower Oil (CLA) 500-1000 MG CAPS Take by mouth      Magnesium Gluconate (MAGNESIUM 27 PO) Take by mouth      meloxicam (MOBIC) 15 mg tablet Take 1 tablet (15 mg total) by mouth daily 30 tablet 2    Milk Thistle 1000 MG CAPS Take by mouth      multivitamin (THERAGRAN) TABS Take 1 tablet by mouth daily      Nutritional Supplements (CREATINE) 750 MG CAPS Take by mouth      olmesartan-hydrochlorothiazide (BENICAR HCT) 20-12 5 MG per tablet TAKE 1 TABLET DAILY  90 tablet 0    pyridoxine (VITAMIN B6) 100 mg tablet Take by mouth      Selenium 200 MCG CAPS Take by mouth      sildenafil (REVATIO) 20 mg tablet Take 20 mg by mouth 3 (three) times a day      testosterone (ANDROGEL PUMP) 1 62 % TD gel pump Apply 1 actuation (20 25 mg total) topically daily 2 actuation 5    verapamil (CALAN-SR) 180 mg CR tablet Take 1 tablet by mouth daily      vitamin E, tocopherol, 400 units capsule Take by mouth      Zinc 30 MG CAPS Take by mouth       No current facility-administered medications for this visit        Current Outpatient Prescriptions on File Prior to Visit   Medication Sig    Apple Cider Vinegar 300 MG TABS Take by mouth    Ascorbic Acid (VITAMIN C) 100 MG tablet Take 100 mg by mouth daily    Calcium Carb-Cholecalciferol (CALCIUM 1000 + D) 1000-800 MG-UNIT TABS Take by mouth    Cholecalciferol (CVS D3) 5000 units capsule Take 1 capsule by mouth daily    coenzyme Q-10 100 MG capsule Take by mouth    CREATINE PO Take by mouth    cyanocobalamin (VITAMIN B-12) 1,000 mcg tablet Take by mouth daily    Flaxseed, Linseed, (FLAX SEED OIL PO) Take by mouth    GARLIC 0830 PO Take by mouth    Glucosamine-Chondroitin 3956-1155 MG/30ML LIQD Take by mouth    Linoleic Acid-Sunflower Oil (CLA) 500-1000 MG CAPS Take by mouth    Magnesium Gluconate (MAGNESIUM 27 PO) Take by mouth    Milk Thistle 1000 MG CAPS Take by mouth    multivitamin (THERAGRAN) TABS Take 1 tablet by mouth daily    Nutritional Supplements (CREATINE) 750 MG CAPS Take by mouth    olmesartan-hydrochlorothiazide (BENICAR HCT) 20-12 5 MG per tablet TAKE 1 TABLET DAILY   pyridoxine (VITAMIN B6) 100 mg tablet Take by mouth    Selenium 200 MCG CAPS Take by mouth    sildenafil (REVATIO) 20 mg tablet Take 20 mg by mouth 3 (three) times a day    testosterone (ANDROGEL PUMP) 1 62 % TD gel pump Apply 1 actuation (20 25 mg total) topically daily    verapamil (CALAN-SR) 180 mg CR tablet Take 1 tablet by mouth daily    vitamin E, tocopherol, 400 units capsule Take by mouth    Zinc 30 MG CAPS Take by mouth    [DISCONTINUED] progesterone (PROMETRIUM) 100 MG capsule Take 100 mg by mouth daily     No current facility-administered medications on file prior to visit  He has No Known Allergies       Review of Systems      Objective:      /82 (BP Location: Right arm, Patient Position: Sitting, Cuff Size: Standard)   Pulse 78   Temp 97 9 °F (36 6 °C)   Ht 5' 6" (1 676 m)   Wt 96 6 kg (213 lb)   SpO2 97%   BMI 34 38 kg/m²          Physical Exam

## 2018-06-27 NOTE — PATIENT INSTRUCTIONS
He knows to use pepcid/prilosec with mobic    Discussed to check bp weekly and if bp 140/90 x 3 to rto for med adjustment

## 2018-07-15 DIAGNOSIS — I10 ESSENTIAL HYPERTENSION: Primary | ICD-10-CM

## 2018-08-22 DIAGNOSIS — I10 ESSENTIAL HYPERTENSION: ICD-10-CM

## 2018-08-22 RX ORDER — OLMESARTAN MEDOXOMIL AND HYDROCHLOROTHIAZIDE 20/12.5 20; 12.5 MG/1; MG/1
1 TABLET ORAL DAILY
Qty: 90 TABLET | Refills: 0 | Status: SHIPPED | OUTPATIENT
Start: 2018-08-22 | End: 2019-01-25 | Stop reason: SDUPTHER

## 2018-09-10 ENCOUNTER — OFFICE VISIT (OUTPATIENT)
Dept: ENDOCRINOLOGY | Facility: CLINIC | Age: 63
End: 2018-09-10
Payer: COMMERCIAL

## 2018-09-10 ENCOUNTER — OFFICE VISIT (OUTPATIENT)
Dept: FAMILY MEDICINE CLINIC | Facility: CLINIC | Age: 63
End: 2018-09-10
Payer: COMMERCIAL

## 2018-09-10 ENCOUNTER — APPOINTMENT (OUTPATIENT)
Dept: LAB | Facility: MEDICAL CENTER | Age: 63
End: 2018-09-10
Payer: COMMERCIAL

## 2018-09-10 VITALS
TEMPERATURE: 97.9 F | DIASTOLIC BLOOD PRESSURE: 98 MMHG | WEIGHT: 223.4 LBS | SYSTOLIC BLOOD PRESSURE: 162 MMHG | OXYGEN SATURATION: 97 % | HEART RATE: 61 BPM | BODY MASS INDEX: 35.9 KG/M2 | HEIGHT: 66 IN

## 2018-09-10 VITALS
HEIGHT: 66 IN | HEART RATE: 62 BPM | WEIGHT: 222.8 LBS | DIASTOLIC BLOOD PRESSURE: 90 MMHG | BODY MASS INDEX: 35.81 KG/M2 | SYSTOLIC BLOOD PRESSURE: 170 MMHG

## 2018-09-10 DIAGNOSIS — R06.83 SNORING: ICD-10-CM

## 2018-09-10 DIAGNOSIS — I10 ESSENTIAL HYPERTENSION: Primary | ICD-10-CM

## 2018-09-10 DIAGNOSIS — I10 BENIGN ESSENTIAL HYPERTENSION: ICD-10-CM

## 2018-09-10 DIAGNOSIS — R79.89 ELEVATED LIVER FUNCTION TESTS: ICD-10-CM

## 2018-09-10 DIAGNOSIS — R79.89 LOW TESTOSTERONE IN MALE: Primary | ICD-10-CM

## 2018-09-10 DIAGNOSIS — R79.89 LOW TESTOSTERONE IN MALE: ICD-10-CM

## 2018-09-10 DIAGNOSIS — E66.9 OBESITY (BMI 35.0-39.9 WITHOUT COMORBIDITY): ICD-10-CM

## 2018-09-10 LAB
25(OH)D3 SERPL-MCNC: 24.8 NG/ML (ref 30–100)
PROLACTIN SERPL-MCNC: 6.9 NG/ML (ref 2.5–17.4)
T4 FREE SERPL-MCNC: 0.89 NG/DL (ref 0.76–1.46)
TSH SERPL DL<=0.05 MIU/L-ACNC: 1.04 UIU/ML (ref 0.36–3.74)

## 2018-09-10 PROCEDURE — 84443 ASSAY THYROID STIM HORMONE: CPT

## 2018-09-10 PROCEDURE — 84305 ASSAY OF SOMATOMEDIN: CPT

## 2018-09-10 PROCEDURE — 84439 ASSAY OF FREE THYROXINE: CPT

## 2018-09-10 PROCEDURE — 99213 OFFICE O/P EST LOW 20 MIN: CPT | Performed by: INTERNAL MEDICINE

## 2018-09-10 PROCEDURE — 99244 OFF/OP CNSLTJ NEW/EST MOD 40: CPT | Performed by: INTERNAL MEDICINE

## 2018-09-10 PROCEDURE — 36415 COLL VENOUS BLD VENIPUNCTURE: CPT

## 2018-09-10 PROCEDURE — 84146 ASSAY OF PROLACTIN: CPT

## 2018-09-10 PROCEDURE — 82306 VITAMIN D 25 HYDROXY: CPT

## 2018-09-10 PROCEDURE — 3008F BODY MASS INDEX DOCD: CPT | Performed by: INTERNAL MEDICINE

## 2018-09-10 PROCEDURE — 83036 HEMOGLOBIN GLYCOSYLATED A1C: CPT

## 2018-09-10 RX ORDER — VERAPAMIL HYDROCHLORIDE 240 MG/1
240 CAPSULE, EXTENDED RELEASE ORAL
Qty: 30 CAPSULE | Refills: 1 | Status: SHIPPED | OUTPATIENT
Start: 2018-09-10 | End: 2018-10-10

## 2018-09-10 NOTE — LETTER
September 10, 2018     Gulshan Farr DO  [address]    Patient: Laetha Richards   YOB: 1955   Date of Visit: 9/10/2018     Dear Dr Priscila Smith      Thank you for referring Leatha Richards to me for evaluation  Below are the relevant portions of my assessment and plan of care  If you have questions, please do not hesitate to call me  I look forward to following [place] along with you           Sincerely,        Gisell Beltran MD        CC: No Recipients    Progress Notes:

## 2018-09-10 NOTE — PROGRESS NOTES
Assessment/Plan:         Diagnoses and all orders for this visit:    Essential hypertension  -     verapamil (VERELAN) 240 MG 24 hr capsule; Take 1 capsule (240 mg total) by mouth daily at bedtime          Subjective:      Patient ID: Micah Shirley is a 58 y o  male  Pt complains of h/a  He feels it is from his bp  170/104        The following portions of the patient's history were reviewed and updated as appropriate:   He  has a past medical history of Sutherland's esophagus; Elevated liver function tests; GERD (gastroesophageal reflux disease); Helicobacter pylori (H  pylori) infection; Hypertension; and Lyme disease  He   Patient Active Problem List    Diagnosis Date Noted    GERD without esophagitis 09/14/2016    Elevated liver function tests 03/09/2016    Benign essential hypertension 04/29/2015     He  has a past surgical history that includes Hernia repair; Bony pelvis surgery; and pr esophagogastroduodenoscopy transoral diagnostic (N/A, 10/18/2017)  His family history includes Hypotension in his mother; Intracerebral hemorrhage in his father; Stroke in his father  He  reports that he has never smoked  He has never used smokeless tobacco  He reports that he does not drink alcohol or use drugs    Current Outpatient Prescriptions   Medication Sig Dispense Refill    Apple Cider Vinegar 300 MG TABS Take by mouth      Ascorbic Acid (VITAMIN C) 100 MG tablet Take 100 mg by mouth daily      Calcium Carb-Cholecalciferol (CALCIUM 1000 + D) 1000-800 MG-UNIT TABS Take by mouth      Cholecalciferol (CVS D3) 5000 units capsule Take 1 capsule by mouth daily      Glucosamine-Chondroitin 3547-0705 MG/30ML LIQD Take by mouth      Magnesium Gluconate (MAGNESIUM 27 PO) Take by mouth      multivitamin (THERAGRAN) TABS Take 1 tablet by mouth daily      Nutritional Supplements (CREATINE) 750 MG CAPS Take by mouth      olmesartan-hydrochlorothiazide (BENICAR HCT) 20-12 5 MG per tablet Take 1 tablet by mouth daily 90 tablet 0    sildenafil (REVATIO) 20 mg tablet Take 20 mg by mouth 3 (three) times a day      verapamil (CALAN-SR) 180 mg CR tablet TAKE 1 TABLET EVERY DAY 90 tablet 1    verapamil (VERELAN) 240 MG 24 hr capsule Take 1 capsule (240 mg total) by mouth daily at bedtime 30 capsule 1    vitamin E, tocopherol, 400 units capsule Take by mouth       No current facility-administered medications for this visit  Current Outpatient Prescriptions on File Prior to Visit   Medication Sig    Apple Cider Vinegar 300 MG TABS Take by mouth    Ascorbic Acid (VITAMIN C) 100 MG tablet Take 100 mg by mouth daily    Calcium Carb-Cholecalciferol (CALCIUM 1000 + D) 1000-800 MG-UNIT TABS Take by mouth    Cholecalciferol (CVS D3) 5000 units capsule Take 1 capsule by mouth daily    Glucosamine-Chondroitin 7435-2113 MG/30ML LIQD Take by mouth    Magnesium Gluconate (MAGNESIUM 27 PO) Take by mouth    multivitamin (THERAGRAN) TABS Take 1 tablet by mouth daily    Nutritional Supplements (CREATINE) 750 MG CAPS Take by mouth    olmesartan-hydrochlorothiazide (BENICAR HCT) 20-12 5 MG per tablet Take 1 tablet by mouth daily    sildenafil (REVATIO) 20 mg tablet Take 20 mg by mouth 3 (three) times a day    verapamil (CALAN-SR) 180 mg CR tablet TAKE 1 TABLET EVERY DAY    vitamin E, tocopherol, 400 units capsule Take by mouth     No current facility-administered medications on file prior to visit  He has No Known Allergies       Review of Systems   Constitutional: Negative  HENT: Negative  Respiratory: Negative  Cardiovascular: Negative  Neurological: Positive for headaches  Objective:      /98 (BP Location: Right arm, Patient Position: Sitting, Cuff Size: Standard)   Pulse 61   Temp 97 9 °F (36 6 °C)   Ht 5' 6" (1 676 m)   Wt 101 kg (223 lb 6 4 oz)   SpO2 97%   BMI 36 06 kg/m²          Physical Exam   Constitutional: He appears well-developed and well-nourished     HENT:   Head: Normocephalic and atraumatic  Right Ear: External ear normal    Left Ear: External ear normal    Mouth/Throat: Oropharynx is clear and moist  No oropharyngeal exudate  Neck: Normal range of motion  Neck supple  Cardiovascular: Normal rate and regular rhythm  Abdominal: Soft   Bowel sounds are normal    Genitourinary: Rectum normal and penis normal

## 2018-09-10 NOTE — PATIENT INSTRUCTIONS
Take vitamin D , 5000 IU daily   Take Centrum , for MEN daily   Stop taking  AndroGel supplementation

## 2018-09-10 NOTE — PROGRESS NOTES
New Patient Progress Note      Referring Provider  Barbara Ramirez Do  [address] [facility], [address]     History of Present Illness:       Maria Lawson is a 58 y o  male who presents with chief complaint of low libido and low testosterone level  He was treated with compound testosterone therapy for 6 yrs by Dr Chelo Lubin ( combination of Testosterone plus DHEA-S supplementation) until 2016, he was off the testosterone T/t until April 2018 , at that time his total testosterone was 259 ng/dl free testosterone - 48  4 ( normal range )   PSA - 1 5 ( 5/2018)      he was started by urologist on AndroGel supplementation since April 2018 , once a day ( 1 pump 1 62%) daily   His BP Is elevated today, 170/90 mm of hg, he denies headaches, vision problems   Denies h/o Heart attack or stroke   No family history of early heart disease and stroke   He has h/o of sleep apnea   Denies hair loss on arm pits, on extremities  Does not have problem in growing colon   He has three children, and did not have h/o infertility   Does not exercise, he works 12 hours a day, and stated does not get enough sleep at night   He denies headaches, vision problems , Prostate problems , Urinary hesitancy or urgency     Denies Family H/o thyroid problems or diabetes     Patient Active Problem List   Diagnosis    Benign essential hypertension    Elevated liver function tests    GERD without esophagitis     Past Medical History:   Diagnosis Date    Sutherland's esophagus     Elevated liver function tests     GERD (gastroesophageal reflux disease)     Helicobacter pylori (H  pylori) infection     last assessed - 27Oct2016    Hypertension     Lyme disease     last assessed - 25Jul2012      Past Surgical History:   Procedure Laterality Date    BONY PELVIS SURGERY      HERNIA REPAIR      AK ESOPHAGOGASTRODUODENOSCOPY TRANSORAL DIAGNOSTIC N/A 10/18/2017    Procedure: ESOPHAGOGASTRODUODENOSCOPY (EGD);   Surgeon: Shayne Brooks MD;  Location: [facility]; Service: Gastroenterology      Family History   Problem Relation Age of Onset    Hypotension Mother     Stroke Father     Intracerebral hemorrhage Father         Cerebral hemorrhage     Social History   Substance Use Topics    Smoking status: Never Smoker    Smokeless tobacco: Never Used    Alcohol use No     No Known Allergies    Current Outpatient Prescriptions:     Apple Cider Vinegar 300 MG TABS, Take by mouth, Disp: , Rfl:     Ascorbic Acid (VITAMIN C) 100 MG tablet, Take 100 mg by mouth daily, Disp: , Rfl:     Calcium Carb-Cholecalciferol (CALCIUM 1000 + D) 1000-800 MG-UNIT TABS, Take by mouth, Disp: , Rfl:     Cholecalciferol (CVS D3) 5000 units capsule, Take 1 capsule by mouth daily, Disp: , Rfl:     Glucosamine-Chondroitin 0794-2118 MG/30ML LIQD, Take by mouth, Disp: , Rfl:     Magnesium Gluconate (MAGNESIUM 27 PO), Take by mouth, Disp: , Rfl:     multivitamin (THERAGRAN) TABS, Take 1 tablet by mouth daily, Disp: , Rfl:     Nutritional Supplements (CREATINE) 750 MG CAPS, Take by mouth, Disp: , Rfl:     olmesartan-hydrochlorothiazide (BENICAR HCT) 20-12 5 MG per tablet, Take 1 tablet by mouth daily, Disp: 90 tablet, Rfl: 0    verapamil (CALAN-SR) 180 mg CR tablet, TAKE 1 TABLET EVERY DAY, Disp: 90 tablet, Rfl: 1    vitamin E, tocopherol, 400 units capsule, Take by mouth, Disp: , Rfl:     sildenafil (REVATIO) 20 mg tablet, Take 20 mg by mouth 3 (three) times a day, Disp: , Rfl:     verapamil (VERELAN) 240 MG 24 hr capsule, Take 1 capsule (240 mg total) by mouth daily at bedtime, Disp: 30 capsule, Rfl: 1    Review of Systems   Constitutional: Positive for fatigue and unexpected weight change  Negative for activity change, appetite change and diaphoresis  HENT: Negative  Eyes: Negative for visual disturbance  Respiratory: Negative for cough, choking, chest tightness and shortness of breath      Cardiovascular: Negative for chest pain, palpitations and leg swelling  Gastrointestinal: Negative  Endocrine: Negative for cold intolerance, heat intolerance, polydipsia, polyphagia and polyuria  Genitourinary: Negative  Musculoskeletal: Negative  Allergic/Immunologic: Negative  Neurological: Negative for dizziness, weakness and light-headedness  Hematological: Negative  Psychiatric/Behavioral: Negative  Physical Exam:  Body mass index is 35 96 kg/m²  /90   Pulse 62   Ht 5' 6" (1 676 m)   Wt 101 kg (222 lb 12 8 oz)   BMI 35 96 kg/m²      Wt Readings from Last 3 Encounters:   09/10/18 101 kg (223 lb 6 4 oz)   09/10/18 101 kg (222 lb 12 8 oz)   06/27/18 96 6 kg (213 lb)       Physical Exam   Constitutional: He is oriented to person, place, and time  He appears well-developed and well-nourished  No distress  HENT:   Head: Normocephalic and atraumatic  Eyes: Conjunctivae and EOM are normal  Pupils are equal, round, and reactive to light  Right eye exhibits no discharge  Left eye exhibits no discharge  Neck: Normal range of motion  Neck supple  No tracheal deviation present  No thyromegaly present  Cardiovascular: Normal rate, regular rhythm, normal heart sounds and intact distal pulses  Exam reveals no friction rub  No murmur heard  Pulmonary/Chest: Effort normal and breath sounds normal  No respiratory distress  He has no wheezes  He has no rales  He exhibits no tenderness  Abdominal: Soft  Bowel sounds are normal  He exhibits no distension  There is no tenderness  Musculoskeletal: Normal range of motion  He exhibits no edema, tenderness or deformity  Lymphadenopathy:     He has no cervical adenopathy  Neurological: He is alert and oriented to person, place, and time  He has normal reflexes  Skin: Skin is warm and dry  No rash noted  He is not diaphoretic  No erythema  No pallor  Psychiatric: He has a normal mood and affect  His behavior is normal    Vitals reviewed      Diabetic Foot Exam    Labs:   No components found for: HA1C  No results found for: GLU    Lab Results   Component Value Date    CREATININE 0 86 03/30/2018    CREATININE 0 88 09/07/2017    CREATININE 0 95 03/23/2017    BUN 15 03/30/2018     09/07/2017    K 4 3 09/07/2017     03/30/2018    CO2 27 03/30/2018     eGFR   Date Value Ref Range Status   03/23/2017 >60 0 ml/min/1 73sq m Final     No components found for: [facility]    Lab Results   Component Value Date    CHOL 137 09/07/2017    HDL 40 (L) 09/07/2017    TRIG 103 09/07/2017       Lab Results   Component Value Date    ALT 41 09/07/2017    AST 19 09/07/2017    GGT 29 09/15/2016    ALKPHOS 79 05/04/2018    BILITOT 0 6 09/07/2017       No results found for: TSH, FREET4, TSI    Impression:  1  Low testosterone in male    2  Obesity (BMI 35 0-39 9 without comorbidity)    3  Benign essential hypertension    4  Elevated liver function tests    5  Snoring         Plan:     Diagnoses and all orders for this visit:    Low testosterone in male  Etiology is unknown, most likely from previous testosterone use( compound therapy) which was started, without any workup,  Have asked patient to stop, Testosterone treatment, as his testosterone level where within normal range, in April 2018  I have discussed to get the blood work as ordered including A1c to rule out prediabetes or diabetes, thyroid blood work to rule out hypothyroidism, discussed importance of lifestyle modifications, as well as ruling out sleep apnea as he complains of feeling tired and sleepy during the day  Discussed importance of weight loss of 10 lb and repeating testosterone again in 4 months after lifestyle modifications  Also order pituitary blood work to be done in 4 months    -     Hemoglobin A1C; Future  -     T4, free Lab Collect; Future  -     TSH, 3rd generation with Free T4 reflex; Future  -     Vitamin D 25 hydroxy Lab Collect;  Future  -     Insulin-like growth factor 1 (IGF-1) - Lab Collect; Future  -     Prolactin- Lab Collect; Future  -     Testosterone, free, total Lab Collect; Future  -     Basic metabolic panel Lab Collect; Future  -     Luteinizing hormone Lab Collect; Future  -     Follicle stimulating hormone Lab Collect; Future  -     Ambulatory referral to Sleep Medicine; Future    Obesity (BMI 35 0-39 9 without comorbidity)  Discussed importance of diet and exercise  Educated to cut down on carbohydrates as well as fatty food  Discussed to start exercise regimen at least moderate activity 30 min daily 5 times a week as tolerated    Comments:  endo consult    Orders:  -     Ambulatory referral to Endocrinology    Benign essential hypertension  His blood pressure is uncontrolled today, however is asymptomatic and does not have any headaches or vision problems  Discussed to make appointment with primary care physician as soon as possible to address hypertension  Also discussed effects of testosterone on blood pressure, his going to stop testosterone regimen today    Elevated liver function tests  Secondary to fatty liver, discuss importance of weight loss, and diet  Based on his A1c will have to start him on metformin  Discussed with patient  Snoring    Will refer him to sleep medicine to rule out sleep apnea and for appropriate manage and of sleep apnea  -     Ambulatory referral to Sleep Medicine; Future    Discussed with the patient and all questioned fully answered  He will call me if any problems arise      Counseled patient on diagnostic results, prognosis, risk and benefit of treatment options, instruction for management, importance of treatment compliance, Risk  factor reduction and impressions      Tyesha Cook MD

## 2018-09-11 DIAGNOSIS — R73.03 PREDIABETES: Primary | ICD-10-CM

## 2018-09-11 LAB
EST. AVERAGE GLUCOSE BLD GHB EST-MCNC: 117 MG/DL
HBA1C MFR BLD: 5.7 % (ref 4.2–6.3)
IGF-I SERPL-MCNC: 85 NG/ML (ref 49–188)

## 2018-09-11 RX ORDER — MULTIVIT-MIN/IRON/FOLIC ACID/K 18-600-40
2000 CAPSULE ORAL DAILY
Refills: 0
Start: 2018-09-11 | End: 2019-01-17 | Stop reason: SDUPTHER

## 2018-09-11 RX ORDER — METFORMIN HYDROCHLORIDE 500 MG/1
500 TABLET, EXTENDED RELEASE ORAL
Qty: 30 TABLET | Refills: 2 | Status: SHIPPED | OUTPATIENT
Start: 2018-09-11 | End: 2018-10-10

## 2018-09-11 NOTE — TELEPHONE ENCOUNTER
----- Message from Kirsty Matute MD sent at 9/11/2018 10:58 AM EDT -----  Please call the patient regarding his abnormal result   Vitamin D Is low, he should start taking vitamin D3, 2000 IU daily, and he has pre-diabetes, a1c 5 7 % , as discussed in office visit, we will start metformin 500 mg ER with dinner, ( this will also with fatty liver, ) please make Rx ready, thanks

## 2018-10-10 ENCOUNTER — OFFICE VISIT (OUTPATIENT)
Dept: FAMILY MEDICINE CLINIC | Facility: CLINIC | Age: 63
End: 2018-10-10
Payer: COMMERCIAL

## 2018-10-10 VITALS
TEMPERATURE: 97 F | SYSTOLIC BLOOD PRESSURE: 134 MMHG | HEART RATE: 62 BPM | DIASTOLIC BLOOD PRESSURE: 86 MMHG | OXYGEN SATURATION: 97 % | BODY MASS INDEX: 33.59 KG/M2 | RESPIRATION RATE: 16 BRPM | WEIGHT: 209 LBS | HEIGHT: 66 IN

## 2018-10-10 DIAGNOSIS — I10 ESSENTIAL HYPERTENSION: Primary | ICD-10-CM

## 2018-10-10 DIAGNOSIS — K76.0 FATTY LIVER: ICD-10-CM

## 2018-10-10 DIAGNOSIS — R73.9 HYPERGLYCEMIA: ICD-10-CM

## 2018-10-10 PROCEDURE — 99213 OFFICE O/P EST LOW 20 MIN: CPT | Performed by: INTERNAL MEDICINE

## 2018-10-10 PROCEDURE — 3079F DIAST BP 80-89 MM HG: CPT | Performed by: INTERNAL MEDICINE

## 2018-10-10 PROCEDURE — 3075F SYST BP GE 130 - 139MM HG: CPT | Performed by: INTERNAL MEDICINE

## 2018-10-10 RX ORDER — VERAPAMIL HYDROCHLORIDE 240 MG/1
240 TABLET, FILM COATED, EXTENDED RELEASE ORAL
Qty: 90 TABLET | Refills: 1 | Status: SHIPPED | OUTPATIENT
Start: 2018-10-10 | End: 2019-04-10 | Stop reason: SDUPTHER

## 2018-10-10 NOTE — PROGRESS NOTES
Assessment/Plan:         Diagnoses and all orders for this visit:    Essential hypertension  -     verapamil (CALAN-SR) 240 mg CR tablet; Take 1 tablet (240 mg total) by mouth daily at bedtime  -     Hepatic function panel; Future    Hyperglycemia  -     Hemoglobin A1C; Future    Fatty liver          Subjective:      Patient ID: Britni King is a 58 y o  male  Feels better on higher dose of verapamil  He lost 14 lbs  The following portions of the patient's history were reviewed and updated as appropriate:   He  has a past medical history of Sutherland's esophagus; Elevated liver function tests; GERD (gastroesophageal reflux disease); Helicobacter pylori (H  pylori) infection; Hypertension; and Lyme disease  He   Patient Active Problem List    Diagnosis Date Noted    Fatty liver 10/10/2018    GERD without esophagitis 09/14/2016    Elevated liver function tests 03/09/2016    Benign essential hypertension 04/29/2015     He  has a past surgical history that includes Hernia repair; Bony pelvis surgery; and pr esophagogastroduodenoscopy transoral diagnostic (N/A, 10/18/2017)  His family history includes Hypotension in his mother; Intracerebral hemorrhage in his father; Stroke in his father  He  reports that he has never smoked  He has never used smokeless tobacco  He reports that he does not drink alcohol or use drugs    Current Outpatient Prescriptions   Medication Sig Dispense Refill    Ascorbic Acid (VITAMIN C) 100 MG tablet Take 100 mg by mouth daily      Cholecalciferol (VITAMIN D) 2000 units CAPS Take 1 capsule (2,000 Units total) by mouth daily  0    Glucosamine-Chondroitin 6705-1034 MG/30ML LIQD Take by mouth      Magnesium Gluconate (MAGNESIUM 27 PO) Take by mouth      multivitamin (THERAGRAN) TABS Take 1 tablet by mouth daily      olmesartan-hydrochlorothiazide (BENICAR HCT) 20-12 5 MG per tablet Take 1 tablet by mouth daily 90 tablet 0    sildenafil (REVATIO) 20 mg tablet Take 20 mg by mouth 3 (three) times a day      vitamin E, tocopherol, 400 units capsule Take by mouth      Cholecalciferol (CVS D3) 5000 units capsule Take 1 capsule by mouth daily      Nutritional Supplements (CREATINE) 750 MG CAPS Take by mouth      verapamil (CALAN-SR) 240 mg CR tablet Take 1 tablet (240 mg total) by mouth daily at bedtime 90 tablet 1     No current facility-administered medications for this visit  Current Outpatient Prescriptions on File Prior to Visit   Medication Sig    Ascorbic Acid (VITAMIN C) 100 MG tablet Take 100 mg by mouth daily    Cholecalciferol (VITAMIN D) 2000 units CAPS Take 1 capsule (2,000 Units total) by mouth daily    Glucosamine-Chondroitin 2199-4775 MG/30ML LIQD Take by mouth    Magnesium Gluconate (MAGNESIUM 27 PO) Take by mouth    multivitamin (THERAGRAN) TABS Take 1 tablet by mouth daily    olmesartan-hydrochlorothiazide (BENICAR HCT) 20-12 5 MG per tablet Take 1 tablet by mouth daily    sildenafil (REVATIO) 20 mg tablet Take 20 mg by mouth 3 (three) times a day    vitamin E, tocopherol, 400 units capsule Take by mouth    Cholecalciferol (CVS D3) 5000 units capsule Take 1 capsule by mouth daily    Nutritional Supplements (CREATINE) 750 MG CAPS Take by mouth     No current facility-administered medications on file prior to visit  He has No Known Allergies       Review of Systems   Constitutional: Negative  HENT: Negative  Respiratory: Negative  Cardiovascular: Negative            Objective:      /86 (BP Location: Right arm, Patient Position: Sitting, Cuff Size: Large)   Pulse 62   Temp (!) 97 °F (36 1 °C) (Tympanic)   Resp 16   Ht 5' 6" (1 676 m)   Wt 94 8 kg (209 lb)   SpO2 97%   BMI 33 73 kg/m²          Physical Exam

## 2018-10-23 LAB — PSA SERPL-MCNC: 1.6 NG/ML

## 2018-10-24 ENCOUNTER — OFFICE VISIT (OUTPATIENT)
Dept: SLEEP CENTER | Facility: CLINIC | Age: 63
End: 2018-10-24
Payer: COMMERCIAL

## 2018-10-24 VITALS
SYSTOLIC BLOOD PRESSURE: 145 MMHG | HEIGHT: 67 IN | DIASTOLIC BLOOD PRESSURE: 80 MMHG | WEIGHT: 212 LBS | BODY MASS INDEX: 33.27 KG/M2

## 2018-10-24 DIAGNOSIS — I10 ESSENTIAL HYPERTENSION: ICD-10-CM

## 2018-10-24 DIAGNOSIS — R79.89 LOW TESTOSTERONE IN MALE: ICD-10-CM

## 2018-10-24 DIAGNOSIS — E66.9 OBESITY (BMI 30-39.9): ICD-10-CM

## 2018-10-24 DIAGNOSIS — R51.9 HEADACHE UPON AWAKENING: ICD-10-CM

## 2018-10-24 DIAGNOSIS — G47.09 OTHER INSOMNIA: ICD-10-CM

## 2018-10-24 DIAGNOSIS — G47.10 HYPERSOMNIA: ICD-10-CM

## 2018-10-24 DIAGNOSIS — R06.83 SNORING: Primary | ICD-10-CM

## 2018-10-24 DIAGNOSIS — G47.26 SHIFT WORK SLEEP DISORDER: ICD-10-CM

## 2018-10-24 PROCEDURE — 99244 OFF/OP CNSLTJ NEW/EST MOD 40: CPT | Performed by: INTERNAL MEDICINE

## 2018-10-24 NOTE — PROGRESS NOTES
Consultation - [facility]  61 y o  male  :1955  BVW:[id number]    Physician Requesting Consult: Yazan Kenny MD     Reason for Consult : At your kind request I saw this patient for initial sleep evaluation today  He is here for a complaint of sleep difficulties  PFSH, Problem List, Medications & Allergies were reviewed in EMR  He  has a past medical history of Sutherland's esophagus; Elevated liver function tests; GERD (gastroesophageal reflux disease); Helicobacter pylori (H  pylori) infection; Hypertension; and Lyme disease  He has a current medication list which includes the following prescription(s): vitamin c, cholecalciferol, vitamin d, glucosamine-chondroitin, magnesium gluconate, multivitamin, creatine, olmesartan-hydrochlorothiazide, sildenafil, verapamil, and vitamin e (tocopherol)  HPI:  He reports difficulties both initiating and maintaining sleep ongoing for several years and has escalated over time  He was snoring until a month ago  Wife says snoring has ceased since he had some intentional weight loss  He is not aware of breathing difficulties during sleep however states on 1 occasion his wife had witnessed apnea  Restless Leg Syndrome: reports no suggestive symptoms    Parasomnia activity: no features reported   Sleep Routine: He works swing shifts and at times may work up to 15 hr a day  Typical Bedtime:  Varies between 2 and 6:00 a m  Gets OOB:  10:00 a m  TIB:  4-8 hrs Pt Estimated TST@:  5 hrs  Sleep latency: <  30 minutes Sleep Interruptions: 3 x/night he is unsure of the cause and at times struggles to fall back asleep  He has racing thoughts and tends to watch the clock worrying about sleep  Awakens: with the aid of an alarm feeling never rested  He awakens with headaches around 3 times a week  He has Excessive Daytime Sleepiness , yawns excessively and feels like napping but avoids doing so    [place] Sleepiness Scale rated at Total score: 10 /24  Habits: reports that he has never smoked  He has never used smokeless tobacco , reports that he does not drink alcohol ,  reports that he does not use drugs  ,Caffeine use: moderate until within few hours of bedtime, Exercise routine: none   Family History: Negative for sleep disturbance  ROS: reviewed & as attached  Significant for 14 lb weight loss in the past 1 month  Acid reflux as resolved  He is in a high stress job and works approximately 70 hr a week  He denied depression  EXAM:  key  [x] system is Normal  [] see notes  VITALS      /80   Ht 5' 7" (1 702 m)   Wt 96 2 kg (212 lb)   BMI 33 20 kg/m²     GENERAL[x]  Well groomed male, well appearing, in no apparent distress  PSYCH      Alert and cooperative  Mental state appears normal  Judgement & Insight good   EXTM/SKN[x]      No pedal edema  Color and hydration are good  Skin is warm and dry  HEAD       [x]     Craniofacial anatomy: normal  Sinuses non- tender  TMJ Normal   EYES       [x] EOMI   LYMPH No Cervical, Submandibular Lymhadenopathy    Neck         []  Neck Circumference: 46 cm, appears thick and muscular; no abnormal masses or  Thyroid is normal  Trachea is central     Nasal        []  Airway is patent Septum is central, Mucous membranes appear normal  Turbinates are normal  There is no rhinorrhea; No PND  Oral          []    Airway       crowded Modified Mallampati class IV (only hard palate visible)  Palate:  redundant soft palateTonsils: no hypertrophy  Teeth normal      CVS         [x]  Heart sounds are regular, No murmurs  RESP       [x]  Effort is normal  Air entry good bilaterally  No wheezes  No rales  ABD         [x]  obese, soft & non-tender  CNS         [x]  WNL Speech is clear & coherant  Grossly intact  Rombergs Negative      MSK         [x]  Muscle bulk, tone and power WNL Gait:normal      IMPRESSION: Primary Sleep/Secondary(to Medical or Psych conditions) & comorbidities 1  Snoring  Ambulatory referral to Sleep Medicine    Diagnostic Sleep Study    CPAP Study   2  Other insomnia     3  Headache upon awakening  Diagnostic Sleep Study    CPAP Study   4  Hypersomnia  Diagnostic Sleep Study    CPAP Study   5  Shift work sleep disorder     6  Essential hypertension     7  Obesity (BMI 30-39 9)     8  Low testosterone in male  Ambulatory referral to Sleep Medicine        PLAN:   1  Comprehensive counseling was provided on pathophysiology, diagnostic strategies & treatment options; effects on symptoms and comorbidities; risks of inadequate therapy; costs and insurance aspects  2  I advised on weight reduction, avoiding sleeping supine, using alcohol or sedating medications close to bed time and on safe driving practices  3  Cognitive behavioral therapy was initiated with advise on Sleep Hygiene and behavioral techniques to manage Insomnia  Specifically, attempting to regularize he is work schedule, keeping a regular sleep routine, avoiding caffeine within around 6 hr of bedtime, starting an exercise program and on relaxation techniques  I also discussed strategies to cope with shift work  4  Nocturnal polysomnography is indicated and a diagnostic study will be scheduled  5  Patient is willing to try Positive airway pressure therapy and will be scheduled for a titration study if indicated  6  Follow-up will be scheduled after the studies to review results, further details of treatment options and to initiate/adjust therapy  Thank you for allowing me to participate in the care of this patient  I will keep you apprised of developments      Sincerely,     Authenticated electronically by Kp Valencia MD   on 71/84/62   Board Certified Specialist

## 2018-10-24 NOTE — PATIENT INSTRUCTIONS
What you can do to improve your sleep: (Sleep Hygiene) Basic rules for a good night's sleep    Create a regular sleep schedule  This will help you form a sleep routine  Keep a record of your sleep patterns, and any sleeping problems you have  Bring the record to follow-up visits with healthcare providers  Avoid prolonged use of light-emitting screens before bedtime or watching TV in bed  Avoid forcing sleep  Do not take naps  Naps could make it hard for you to fall asleep at bedtime  Deal with your worries before bedtime  Keep your bedroom cool, quiet, and dark  Turn on white noise, such as a fan, to help you relax  Do not use your bed for any activity that will keep you awake  Do not read, exercise, eat, or watch TV in your bedroom  Get up if you do not fall asleep within 20 minutes  Move to another room and do something relaxing until you become sleepy  Limit caffeine, alcohol, nicotine and food to earlier in the day  Only drink caffeine in the morning  Do not drink alcohol within 6 hours of bedtime  Do not eat a heavy meal right before you go to bed  Avoid smoking, especially in the evening  Exercise regularly  Daily exercise will help you sleep better  Do not exercise within 4 hours of bedtime  Stimulus control therapy rules  1  Go to bed only when sleepy  2  Do not watch television, read, eat, or worry while in bed  Use bed only for sleep and sex  3  Get out of bed if unable to fall asleep within 20 minutes and go to another room  Return to bed only when sleepy  Repeat this step as many times as necessary throughout the night  4  Set an alarm clock to wake up at a fixed time each morning, including weekends  5  Do not take a nap during the day  Data from: [address] [place] [address], [address] [employer] Nonpharmacologic treatments of insomnia  J Clin Psychiatry 8543; 53:37  Go to [employer] website for more information: Sleepeducation  org     Recommended Reading:  Book by authors Sejal Rosas No More sleepless nights    What is ANN MARIE? Obstructive sleep apnea is a common and serious sleep disorder that causes you to stop breathing during sleep  The airway repeatedly becomes blocked, limiting the amount of air that reaches your lungs  When this happens, you may snore loudly or making choking noises as you try to breathe  Your brain and body becomes oxygen deprived and you may wake up  This may happen a few times a night, or in more severe cases, several hundred times a night  Sleep apnea can make you wake up in the morning feeling tired or unrefreshed even though you have had a full night of sleep  During the day, you may feel fatigued, have difficulty concentrating or you may even unintentionally fall asleep  This is because your body is waking up numerous times throughout the night, even though you might not be conscious of each awakening  The lack of oxygen your body receives can have negative long-term consequences for your health  This includes:  High blood pressure  Heart disease  Irregular heart rhythms  Stroke  Pre-diabetes and diabetes  Depression    Testing  An objective evaluation of your sleep may be needed before your board certified sleep physician can make a diagnosis  Options include:   In-lab overnight sleep study  This type of sleep study requires you to stay overnight at a sleep center, in a bed that may resemble a hotel room  You will sleep with sensors hooked up to various parts of your body  These sensors record your brain waves, heartbeat, breathing and movement  An overnight sleep study also provides your doctor with the most complete information about your sleep   Learn more about an overnight sleep study      Home sleep apnea test  Some patients with high risk factors for obstructive sleep apnea and no other medical disorders may be candidates for a home sleep apnea test  The testing equipment differs in that it is less complicated than what is used in an overnight sleep study  As such, does not give all the data an in-lab will and if negative, may not mean you do not have the problem  Treatment for sleep apnea  includes using a continuous positive airway pressure (CPAP) machine to keep your airway open during sleep  A mask is placed over your nose and mouth, or just your nose  The mask is hooked to the CPAP machine that blows a gentle stream of air into the mask when you breathe  This helps keep your airway open so you can breathe more regularly  Extra oxygen may be given to you through the machine  You may be given a mouth device  It looks like a mouth guard or dental retainer and stops your tongue and mouth tissues from blocking your throat while you sleep  Surgery may be needed to remove extra tissues that block your mouth, throat, or nose  Manage sleep apnea:   Do not smoke  Nicotine and other chemicals in cigarettes and cigars can cause lung damage  Ask your healthcare provider for information if you currently smoke and need help to quit  E-cigarettes or smokeless tobacco still contain nicotine  Talk to your healthcare provider before you use these products  Do not drink alcohol or take sedative medicine before you go to sleep  Alcohol and sedatives can relax the muscles and tissues around your throat  This can block the airflow to your lungs  Maintain a healthy weight  Excess tissue around your throat may restrict your breathing  Ask your healthcare provider for information if you need to lose weight  Sleep on your side or use pillows designed to prevent sleep apnea  This prevents your tongue or other tissues from blocking your throat  You can also raise the head of your bed  Driving Safety  Refrain from driving when drowsy  Follow up with your healthcare provider as directed:  Write down your questions so you remember to ask them during your visits  Go to [employer]ASM website for more information: Sleepeducation  org     What is ANN MARIE?    Obstructive sleep apnea is a common and serious sleep disorder that causes you to stop breathing during sleep  The airway repeatedly becomes blocked, limiting the amount of air that reaches your lungs  When this happens, you may snore loudly or making choking noises as you try to breathe  Your brain and body becomes oxygen deprived and you may wake up  This may happen a few times a night, or in more severe cases, several hundred times a night  Sleep apnea can make you wake up in the morning feeling tired or unrefreshed even though you have had a full night of sleep  During the day, you may feel fatigued, have difficulty concentrating or you may even unintentionally fall asleep  This is because your body is waking up numerous times throughout the night, even though you might not be conscious of each awakening  The lack of oxygen your body receives can have negative long-term consequences for your health  This includes:  High blood pressure  Heart disease  Irregular heart rhythms  Stroke  Pre-diabetes and diabetes  Depression    Testing  An objective evaluation of your sleep may be needed before your board certified sleep physician can make a diagnosis  Options include:   In-lab overnight sleep study  This type of sleep study requires you to stay overnight at a sleep center, in a bed that may resemble a hotel room  You will sleep with sensors hooked up to various parts of your body  These sensors record your brain waves, heartbeat, breathing and movement  An overnight sleep study also provides your doctor with the most complete information about your sleep  Learn more about an overnight sleep study      Home sleep apnea test  Some patients with high risk factors for obstructive sleep apnea and no other medical disorders may be candidates for a home sleep apnea test  The testing equipment differs in that it is less complicated than what is used in an overnight sleep study   As such, does not give all the data an in-lab will and if negative, may not mean you do not have the problem  Treatment for sleep apnea  includes using a continuous positive airway pressure (CPAP) machine to keep your airway open during sleep  A mask is placed over your nose and mouth, or just your nose  The mask is hooked to the CPAP machine that blows a gentle stream of air into the mask when you breathe  This helps keep your airway open so you can breathe more regularly  Extra oxygen may be given to you through the machine  You may be given a mouth device  It looks like a mouth guard or dental retainer and stops your tongue and mouth tissues from blocking your throat while you sleep  Surgery may be needed to remove extra tissues that block your mouth, throat, or nose  Manage sleep apnea:   Do not smoke  Nicotine and other chemicals in cigarettes and cigars can cause lung damage  Ask your healthcare provider for information if you currently smoke and need help to quit  E-cigarettes or smokeless tobacco still contain nicotine  Talk to your healthcare provider before you use these products  Do not drink alcohol or take sedative medicine before you go to sleep  Alcohol and sedatives can relax the muscles and tissues around your throat  This can block the airflow to your lungs  Maintain a healthy weight  Excess tissue around your throat may restrict your breathing  Ask your healthcare provider for information if you need to lose weight  Sleep on your side or use pillows designed to prevent sleep apnea  This prevents your tongue or other tissues from blocking your throat  You can also raise the head of your bed  Driving Safety  Refrain from driving when drowsy  Follow up with your healthcare provider as directed:  Write down your questions so you remember to ask them during your visits  Go to [employer] website for more information: Sleepeducation  org

## 2018-10-24 NOTE — PROGRESS NOTES
Review of Systems      Genitourinary none   Cardiology none   Gastrointestinal none   Neurology awaken with headache and forgetfulness   Constitutional none   Integumentary none   Psychiatry none   Musculoskeletal none   Pulmonary none   ENT none   Endocrine none   Hematological none

## 2018-11-06 DIAGNOSIS — N52.9 ERECTILE DYSFUNCTION, UNSPECIFIED ERECTILE DYSFUNCTION TYPE: Primary | ICD-10-CM

## 2018-11-06 RX ORDER — SILDENAFIL CITRATE 20 MG/1
TABLET ORAL
Qty: 50 TABLET | Refills: 3 | Status: SHIPPED | OUTPATIENT
Start: 2018-11-06 | End: 2021-02-17 | Stop reason: SDUPTHER

## 2018-12-13 ENCOUNTER — LAB (OUTPATIENT)
Dept: LAB | Facility: CLINIC | Age: 63
End: 2018-12-13
Payer: COMMERCIAL

## 2018-12-13 DIAGNOSIS — R79.89 LOW TESTOSTERONE IN MALE: ICD-10-CM

## 2018-12-13 DIAGNOSIS — I10 ESSENTIAL HYPERTENSION: ICD-10-CM

## 2018-12-13 DIAGNOSIS — R79.89 ELEVATED LIVER FUNCTION TESTS: ICD-10-CM

## 2018-12-13 DIAGNOSIS — R73.9 HYPERGLYCEMIA: ICD-10-CM

## 2018-12-13 DIAGNOSIS — E29.1 TESTICULAR HYPOFUNCTION: ICD-10-CM

## 2018-12-13 DIAGNOSIS — Z12.5 SCREENING FOR PROSTATE CANCER: ICD-10-CM

## 2018-12-13 LAB
ALBUMIN SERPL BCP-MCNC: 4 G/DL (ref 3.5–5)
ALP SERPL-CCNC: 74 U/L (ref 46–116)
ALT SERPL W P-5'-P-CCNC: 50 U/L (ref 12–78)
ANION GAP SERPL CALCULATED.3IONS-SCNC: 5 MMOL/L (ref 4–13)
AST SERPL W P-5'-P-CCNC: 18 U/L (ref 5–45)
BILIRUB DIRECT SERPL-MCNC: 0.18 MG/DL (ref 0–0.2)
BILIRUB SERPL-MCNC: 0.68 MG/DL (ref 0.2–1)
BUN SERPL-MCNC: 11 MG/DL (ref 5–25)
CALCIUM SERPL-MCNC: 9.6 MG/DL (ref 8.3–10.1)
CHLORIDE SERPL-SCNC: 107 MMOL/L (ref 100–108)
CO2 SERPL-SCNC: 26 MMOL/L (ref 21–32)
CREAT SERPL-MCNC: 0.8 MG/DL (ref 0.6–1.3)
EST. AVERAGE GLUCOSE BLD GHB EST-MCNC: 123 MG/DL
FSH SERPL-ACNC: 3.3 MIU/ML (ref 0.7–10.8)
GFR SERPL CREATININE-BSD FRML MDRD: 95 ML/MIN/1.73SQ M
GLUCOSE P FAST SERPL-MCNC: 80 MG/DL (ref 65–99)
HBA1C MFR BLD: 5.9 % (ref 4.2–6.3)
LH SERPL-ACNC: 4.3 MIU/ML (ref 1.2–10.6)
POTASSIUM SERPL-SCNC: 3.9 MMOL/L (ref 3.5–5.3)
PROT SERPL-MCNC: 7.5 G/DL (ref 6.4–8.2)
SODIUM SERPL-SCNC: 138 MMOL/L (ref 136–145)

## 2018-12-13 PROCEDURE — 84403 ASSAY OF TOTAL TESTOSTERONE: CPT

## 2018-12-13 PROCEDURE — 80076 HEPATIC FUNCTION PANEL: CPT

## 2018-12-13 PROCEDURE — 84154 ASSAY OF PSA FREE: CPT

## 2018-12-13 PROCEDURE — 83001 ASSAY OF GONADOTROPIN (FSH): CPT

## 2018-12-13 PROCEDURE — 83036 HEMOGLOBIN GLYCOSYLATED A1C: CPT

## 2018-12-13 PROCEDURE — 84153 ASSAY OF PSA TOTAL: CPT

## 2018-12-13 PROCEDURE — 80048 BASIC METABOLIC PNL TOTAL CA: CPT

## 2018-12-13 PROCEDURE — 84402 ASSAY OF FREE TESTOSTERONE: CPT

## 2018-12-13 PROCEDURE — 83002 ASSAY OF GONADOTROPIN (LH): CPT

## 2018-12-13 PROCEDURE — 36415 COLL VENOUS BLD VENIPUNCTURE: CPT

## 2018-12-14 LAB
PSA FREE MFR SERPL: 15.3 %
PSA FREE SERPL-MCNC: 0.23 NG/ML
PSA SERPL-MCNC: 1.5 NG/ML (ref 0–4)
TESTOST FREE SERPL-MCNC: 3.6 PG/ML (ref 6.6–18.1)
TESTOST SERPL-MCNC: 261 NG/DL (ref 264–916)

## 2018-12-24 ENCOUNTER — TRANSCRIBE ORDERS (OUTPATIENT)
Dept: SLEEP CENTER | Facility: CLINIC | Age: 63
End: 2018-12-24

## 2018-12-24 DIAGNOSIS — G47.33 OSA (OBSTRUCTIVE SLEEP APNEA): Primary | ICD-10-CM

## 2018-12-31 ENCOUNTER — HOSPITAL ENCOUNTER (OUTPATIENT)
Dept: SLEEP CENTER | Facility: CLINIC | Age: 63
Discharge: HOME | End: 2018-12-31
Payer: COMMERCIAL

## 2018-12-31 DIAGNOSIS — G47.33 OSA (OBSTRUCTIVE SLEEP APNEA): ICD-10-CM

## 2018-12-31 PROCEDURE — G0399 HOME SLEEP TEST/TYPE 3 PORTA: HCPCS

## 2019-01-02 NOTE — PROGRESS NOTES
1  Prostate cancer screening  PSA, Total Screen       Assessment and plan:       1  Low testosterone -- managed by Dr Arianna Montalvo  2  Erectile dysfunction  3  Prostate cancer screening   - patient is following with endocrinologist for his low testosterone levels at this time  I encouraged him to continue with cardiovascular exercise  Patient will need routine prostate cancer screening on an annual basis  PSA within normal limits with benign prostate examination today  Follow-up 1 year PSA prior to visit  Haja Chan PA-C      Chief Complaint     Chief Complaint   Patient presents with    Erectile Dysfunction    low testosterone         History of Present Illness     Cristobal Campbell is a 61 y o  male patient of Dr Arianna Montalvo with a history of erectile dysfunction and low testosterone presenting for follow up  Patient has been managed on AndroGel 1 62% 2 pumps daily  Patient had a recent total testosterone 261 with a free testosterone of 3 6 (12/13/2018) at 10:39 a m       Most recent PSA of 1 5 with a percent free PSA of 15 3% (12/13/18)  Patient has established with an endocrinologist    Patient states that he will be discussing with his endocrinologist in the near future treatment of his testosterone levels  He denies any changes in his energy levels and feels like he still has a low libido  Patient reports that he has a good urinary stream, feels empty after urination, denies nocturia  Denies any dysuria, gross hematuria, hesitancy, urgency, or incontinence  Laboratory     Lab Results   Component Value Date    CREATININE 0 80 12/13/2018       Lab Results   Component Value Date    PSA 1 5 12/13/2018    PSA 1 6 10/22/2018    PSA 1 5 05/04/2018       Review of Systems     Review of Systems   Constitutional: Negative for activity change, appetite change, chills, diaphoresis, fatigue, fever and unexpected weight change     Respiratory: Negative for chest tightness and shortness of breath  Cardiovascular: Negative for chest pain, palpitations and leg swelling  Gastrointestinal: Negative for abdominal distention, abdominal pain, constipation, diarrhea, nausea and vomiting  Genitourinary: Negative for decreased urine volume, difficulty urinating, dysuria, enuresis, flank pain, frequency, genital sores, hematuria and urgency  Musculoskeletal: Negative for back pain, gait problem and myalgias  Skin: Negative for color change, pallor, rash and wound  Psychiatric/Behavioral: Negative for behavioral problems  The patient is not nervous/anxious  Allergies     No Known Allergies    Physical Exam     Physical Exam   Constitutional: He is oriented to person, place, and time  He appears well-developed and well-nourished  No distress  HENT:   Head: Normocephalic and atraumatic  Eyes: Conjunctivae are normal    Neck: Normal range of motion  No tracheal deviation present  Pulmonary/Chest: Effort normal    Genitourinary:   Genitourinary Comments: Good rectal tone  Prostate 30g, smooth, symmetric, no nodules   Musculoskeletal: Normal range of motion  He exhibits no edema or deformity  Neurological: He is alert and oriented to person, place, and time  Skin: Skin is warm and dry  No rash noted  He is not diaphoretic  No erythema  Psychiatric: He has a normal mood and affect   His behavior is normal          Vital Signs     Vitals:    01/03/19 0956   BP: 150/90   Pulse: 84   Weight: 96 6 kg (213 lb)   Height: 5' 7" (1 702 m)         Current Medications       Current Outpatient Prescriptions:     Ascorbic Acid (VITAMIN C) 100 MG tablet, Take 100 mg by mouth daily, Disp: , Rfl:     Cholecalciferol ([employer] D3) 5000 units capsule, Take 1 capsule by mouth daily, Disp: , Rfl:     multivitamin (THERAGRAN) TABS, Take 1 tablet by mouth daily, Disp: , Rfl:     olmesartan-hydrochlorothiazide (BENICAR HCT) 20-12 5 MG per tablet, Take 1 tablet by mouth daily, Disp: 90 tablet, Rfl: 0   sildenafil (REVATIO) 20 mg tablet, TAKE 2-5 TABLETS BY MOUTH AS NEEDED FOR SEXUAL ACTIVITY, Disp: 50 tablet, Rfl: 3    verapamil (CALAN-SR) 240 mg CR tablet, Take 1 tablet (240 mg total) by mouth daily at bedtime, Disp: 90 tablet, Rfl: 1    vitamin E, tocopherol, 400 units capsule, Take by mouth, Disp: , Rfl:     Cholecalciferol (VITAMIN D) 2000 units CAPS, Take 1 capsule (2,000 Units total) by mouth daily (Patient not taking: Reported on 1/3/2019 ), Disp: , Rfl: 0    Glucosamine-Chondroitin 8525-2424 MG/30ML LIQD, Take by mouth, Disp: , Rfl:     Magnesium Gluconate (MAGNESIUM 27 PO), Take by mouth, Disp: , Rfl:     Nutritional Supplements (CREATINE) 750 MG CAPS, Take by mouth, Disp: , Rfl:       Active Problems     Patient Active Problem List   Diagnosis    Essential hypertension    Elevated liver function tests    GERD without esophagitis    Fatty liver    Snoring    Other insomnia    Headache upon awakening    Hypersomnia    Obesity (BMI 30-39  9)    Low testosterone in male    Shift work sleep disorder    ANN MARIE (obstructive sleep apnea)       Past Medical History     Past Medical History:   Diagnosis Date    Sutherland's esophagus     Elevated liver function tests     GERD (gastroesophageal reflux disease)     Helicobacter pylori (H  pylori) infection     last assessed - [id number]    Hypertension     Lyme disease     last assessed - [id number]       Surgical History     Past Surgical History:   Procedure Laterality Date    BONY PELVIS SURGERY      HERNIA REPAIR      CO ESOPHAGOGASTRODUODENOSCOPY TRANSORAL DIAGNOSTIC N/A 10/18/2017    Procedure: ESOPHAGOGASTRODUODENOSCOPY (EGD); Surgeon: Luis Oates MD;  Location: [facility];   Service: Gastroenterology       Family History     Family History   Problem Relation Age of Onset    Hypotension Mother     Stroke Father     Intracerebral hemorrhage Father         Cerebral hemorrhage       Social History     Social History       Radiology

## 2019-01-03 ENCOUNTER — OFFICE VISIT (OUTPATIENT)
Dept: UROLOGY | Facility: CLINIC | Age: 64
End: 2019-01-03
Payer: COMMERCIAL

## 2019-01-03 VITALS
BODY MASS INDEX: 33.43 KG/M2 | WEIGHT: 213 LBS | DIASTOLIC BLOOD PRESSURE: 90 MMHG | SYSTOLIC BLOOD PRESSURE: 150 MMHG | HEIGHT: 67 IN | HEART RATE: 84 BPM

## 2019-01-03 DIAGNOSIS — Z12.5 PROSTATE CANCER SCREENING: Primary | ICD-10-CM

## 2019-01-03 PROCEDURE — 99213 OFFICE O/P EST LOW 20 MIN: CPT | Performed by: PHYSICIAN ASSISTANT

## 2019-01-10 DIAGNOSIS — G47.33 OSA (OBSTRUCTIVE SLEEP APNEA): Primary | ICD-10-CM

## 2019-01-17 ENCOUNTER — OFFICE VISIT (OUTPATIENT)
Dept: ENDOCRINOLOGY | Facility: CLINIC | Age: 64
End: 2019-01-17
Payer: COMMERCIAL

## 2019-01-17 VITALS
HEART RATE: 64 BPM | DIASTOLIC BLOOD PRESSURE: 88 MMHG | SYSTOLIC BLOOD PRESSURE: 130 MMHG | HEIGHT: 67 IN | BODY MASS INDEX: 34.18 KG/M2 | WEIGHT: 217.8 LBS

## 2019-01-17 DIAGNOSIS — E55.9 VITAMIN D DEFICIENCY: ICD-10-CM

## 2019-01-17 DIAGNOSIS — R06.83 SNORING: ICD-10-CM

## 2019-01-17 DIAGNOSIS — R79.89 ELEVATED LIVER FUNCTION TESTS: ICD-10-CM

## 2019-01-17 DIAGNOSIS — R73.03 PREDIABETES: ICD-10-CM

## 2019-01-17 DIAGNOSIS — R79.89 LOW TESTOSTERONE IN MALE: Primary | ICD-10-CM

## 2019-01-17 DIAGNOSIS — E66.9 OBESITY (BMI 35.0-39.9 WITHOUT COMORBIDITY): ICD-10-CM

## 2019-01-17 DIAGNOSIS — I10 BENIGN ESSENTIAL HYPERTENSION: ICD-10-CM

## 2019-01-17 PROCEDURE — 99214 OFFICE O/P EST MOD 30 MIN: CPT | Performed by: INTERNAL MEDICINE

## 2019-01-17 NOTE — PROGRESS NOTES
New Patient Progress Note      Referring Provider  Lorene Zhu Do  [phone] [facility], [address]     History of Present Illness:       Boston Batista is a 61 y o  male who presents with chief complaint of low libido and low testosterone level  He was treated with compound testosterone therapy for 6 yrs by Dr Ortega Yang ( combination of Testosterone plus DHEA-S supplementation) until 2016, he was off the testosterone T/t until April 2018 , at that time his total testosterone was 259 ng/dl free testosterone - 48  4 ( normal range )   PSA - 1 5 ( 5/2018)      he was started by urologist on AndroGel supplementation since April 2018 , once a day ( 1 pump 1 62%) daily   With even small dose of testosterone treatment, his blood pressure was elevated up to 170 millimeter of Hg(systolic)  Testosterone supplementation was stopped    He does not have history of stroke or heart attack  He also has history of sleep apnea, he works long hours, and does not get time to exercise or and does not get sleep at night  For vitamin-D deficiency is currently taking 5000 International Units daily of vitamin-D supplementation    For hypertension he is on Benicar 20/12 5 milligram daily, verapamil 240 milligram daily  He denies any prostate problems in the past    He also has prediabetes, however does not want to take any medication, he is motivated to do lifestyle modifications      Wt Readings from Last 3 Encounters:   01/17/19 98 8 kg (217 lb 12 8 oz)   01/03/19 96 6 kg (213 lb)   10/24/18 96 2 kg (212 lb)       Lab Results   Component Value Date    HGBA1C 5 9 12/13/2018         Component      Latest Ref Rng & Units 9/10/2018 10/22/2018   Sodium      136 - 145 mmol/L     Potassium      3 5 - 5 3 mmol/L     Chloride      100 - 108 mmol/L     CO2      21 - 32 mmol/L     Anion Gap      4 - 13 mmol/L     BUN      5 - 25 mg/dL     Creatinine      0 60 - 1 30 mg/dL     GLUCOSE FASTING      65 - 99 mg/dL Calcium      8 3 - 10 1 mg/dL     eGFR      ml/min/1 73sq m     TOTAL BILIRUBIN      0 20 - 1 00 mg/dL     BILIRUBIN DIRECT      0 00 - 0 20 mg/dL     Alkaline Phosphatase      46 - 116 U/L     AST      5 - 45 U/L     ALT      12 - 78 U/L     Total Protein      6 4 - 8 2 g/dL     Albumin      3 5 - 5 0 g/dL     PSA, Total      0 0 - 4 0 ng/mL  1 6   PROSTATE SPECIFIC ANTIGEN FREE      N/A ng/mL     PROSTATE SPECIFIC ANTIGEN PERCENT FREE      %     TESTOSTERONE FREE      6 6 - 18 1 pg/mL     Testosterone, Total, LC/MS      264 - 916 ng/dL     Hemoglobin A1C      4 2 - 6 3 %     EAG      mg/dl     INSULIN-LIKE GROWTH FACTOR-1      49 - 188 ng/mL 85    PROLACTIN      2 5 - 17 4 ng/mL 6 9    LUTEINIZING HORMONE      1 2 - 10 6 mIU/mL     FSH, POC      0 7 - 10 8 mIU/mL       Component      Latest Ref Rng & Units 12/13/2018   Sodium      136 - 145 mmol/L 138   Potassium      3 5 - 5 3 mmol/L 3 9   Chloride      100 - 108 mmol/L 107   CO2      21 - 32 mmol/L 26   Anion Gap      4 - 13 mmol/L 5   BUN      5 - 25 mg/dL 11   Creatinine      0 60 - 1 30 mg/dL 0 80   GLUCOSE FASTING      65 - 99 mg/dL 80   Calcium      8 3 - 10 1 mg/dL 9 6   eGFR      ml/min/1 73sq m 95   TOTAL BILIRUBIN      0 20 - 1 00 mg/dL 0 68   BILIRUBIN DIRECT      0 00 - 0 20 mg/dL 0 18   Alkaline Phosphatase      46 - 116 U/L 74   AST      5 - 45 U/L 18   ALT      12 - 78 U/L 50   Total Protein      6 4 - 8 2 g/dL 7 5   Albumin      3 5 - 5 0 g/dL 4 0   PSA, Total      0 0 - 4 0 ng/mL 1 5   PROSTATE SPECIFIC ANTIGEN FREE      N/A ng/mL 0 23   PROSTATE SPECIFIC ANTIGEN PERCENT FREE      % 15 3   TESTOSTERONE FREE      6 6 - 18 1 pg/mL 3 6 (L)   Testosterone, Total, LC/MS      264 - 916 ng/dL 261 (L)   Hemoglobin A1C      4 2 - 6 3 % 5 9   EAG      mg/dl 123   INSULIN-LIKE GROWTH FACTOR-1      49 - 188 ng/mL    PROLACTIN      2 5 - 17 4 ng/mL    LUTEINIZING HORMONE      1 2 - 10 6 mIU/mL 4 3   FSH, POC      0 7 - 10 8 mIU/mL 3 3       Patient Active Problem List   Diagnosis    Essential hypertension    Elevated liver function tests    GERD without esophagitis    Fatty liver    Snoring    Other insomnia    Headache upon awakening    Hypersomnia    Obesity (BMI 30-39  9)    Low testosterone in male    Shift work sleep disorder    ANN MARIE (obstructive sleep apnea)     Past Medical History:   Diagnosis Date    Sutherland's esophagus     Elevated liver function tests     GERD (gastroesophageal reflux disease)     Helicobacter pylori (H  pylori) infection     last assessed - 27Oct2016    Hypertension     Lyme disease     last assessed - [id number]      Past Surgical History:   Procedure Laterality Date    BONY PELVIS SURGERY      HERNIA REPAIR      OK ESOPHAGOGASTRODUODENOSCOPY TRANSORAL DIAGNOSTIC N/A 10/18/2017    Procedure: ESOPHAGOGASTRODUODENOSCOPY (EGD); Surgeon: Shara Suárez MD;  Location: [facility];   Service: Gastroenterology      Family History   Problem Relation Age of Onset    Hypotension Mother     Stroke Father     Intracerebral hemorrhage Father         Cerebral hemorrhage     Social History   Substance Use Topics    Smoking status: Never Smoker    Smokeless tobacco: Never Used    Alcohol use No     No Known Allergies    Current Outpatient Prescriptions:     Ascorbic Acid (VITAMIN C) 100 MG tablet, Take 100 mg by mouth daily, Disp: , Rfl:     Cholecalciferol (CVS D3) 5000 units capsule, Take 1 capsule by mouth daily, Disp: , Rfl:     multivitamin (THERAGRAN) TABS, Take 1 tablet by mouth daily, Disp: , Rfl:     olmesartan-hydrochlorothiazide (BENICAR HCT) 20-12 5 MG per tablet, Take 1 tablet by mouth daily, Disp: 90 tablet, Rfl: 0    sildenafil (REVATIO) 20 mg tablet, TAKE 2-5 TABLETS BY MOUTH AS NEEDED FOR SEXUAL ACTIVITY, Disp: 50 tablet, Rfl: 3    verapamil (CALAN-SR) 240 mg CR tablet, Take 1 tablet (240 mg total) by mouth daily at bedtime, Disp: 90 tablet, Rfl: 1    vitamin E, tocopherol, 400 units capsule, Take by mouth, Disp: , Rfl:     Glucosamine-Chondroitin 0608-5453 MG/30ML LIQD, Take by mouth, Disp: , Rfl:     Magnesium Gluconate (MAGNESIUM 27 PO), Take by mouth, Disp: , Rfl:     Nutritional Supplements (CREATINE) 750 MG CAPS, Take by mouth, Disp: , Rfl:     Review of Systems   Constitutional: Positive for fatigue and unexpected weight change  Negative for activity change, appetite change and diaphoresis  HENT: Negative  Eyes: Negative for visual disturbance  Respiratory: Negative for cough, choking, chest tightness and shortness of breath  Cardiovascular: Negative for chest pain, palpitations and leg swelling  Gastrointestinal: Negative  Endocrine: Negative for cold intolerance, heat intolerance, polydipsia, polyphagia and polyuria  Genitourinary: Negative  Musculoskeletal: Negative  Allergic/Immunologic: Negative  Neurological: Negative for dizziness, weakness and light-headedness  Hematological: Negative  Psychiatric/Behavioral: Negative  Physical Exam:  Body mass index is 34 11 kg/m²  /88   Pulse 64   Ht 5' 7" (1 702 m)   Wt 98 8 kg (217 lb 12 8 oz)   BMI 34 11 kg/m²      Wt Readings from Last 3 Encounters:   01/17/19 98 8 kg (217 lb 12 8 oz)   01/03/19 96 6 kg (213 lb)   10/24/18 96 2 kg (212 lb)       Physical Exam   Constitutional: He is oriented to person, place, and time  He appears well-developed and well-nourished  No distress  HENT:   Head: Normocephalic and atraumatic  Eyes: Pupils are equal, round, and reactive to light  Conjunctivae and EOM are normal  Right eye exhibits no discharge  Left eye exhibits no discharge  Neck: Normal range of motion  Neck supple  No thyromegaly present  Cardiovascular: Normal rate, regular rhythm, normal heart sounds and intact distal pulses  Exam reveals no friction rub  No murmur heard  Pulmonary/Chest: Effort normal and breath sounds normal  No respiratory distress  Abdominal: Soft   Bowel sounds are normal  He exhibits no distension  There is no tenderness  Musculoskeletal: Normal range of motion  He exhibits no edema or deformity  Neurological: He is alert and oriented to person, place, and time  Skin: Skin is warm and dry  He is not diaphoretic  No erythema  Psychiatric: He has a normal mood and affect  His behavior is normal    Vitals reviewed  Diabetic Foot Exam    Labs:   No components found for: HA1C  No components found for: GLU    Lab Results   Component Value Date    CREATININE 0 80 12/13/2018    CREATININE 0 88 09/07/2017    CREATININE 0 95 03/23/2017    BUN 11 12/13/2018     09/07/2017    K 3 9 12/13/2018     12/13/2018    CO2 26 12/13/2018     eGFR   Date Value Ref Range Status   12/13/2018 95 ml/min/1 73sq m Final     No components found for: [facility]    Lab Results   Component Value Date    CHOL 137 09/07/2017    HDL 40 (L) 09/07/2017    TRIG 103 09/07/2017       Lab Results   Component Value Date    ALT 50 12/13/2018    AST 18 12/13/2018    GGT 29 09/15/2016    ALKPHOS 74 12/13/2018    BILITOT 0 6 09/07/2017       Lab Results   Component Value Date    FREET4 0 89 09/10/2018       Impression:  1  Low testosterone in male    2  Obesity (BMI 35 0-39 9 without comorbidity)    3  Benign essential hypertension    4  Elevated liver function tests    5  Snoring         Plan:     Diagnoses and all orders for this visit:    Low testosterone in male  Etiology is unknown, most likely from previous testosterone use( compound therapy) which was started, without any workup,  Patient is currently off the testosterone supplementation since April 2018  Recent testosterone level is low, also patient had elevation on blood pressure, testosterone supplementation previously, patient is hesitant to start testosterone treatment  Also discussed the effect of metabolic disturbance, including weight gain, lack of exercise, lack of sleep on testosterone production      I have again discussed importance of lifestyle modifications, Again discussed importance of weight loss and exercise regimen to stimulate endogenous testosterone production  Previously pituitary workup is negative  Will repeat testosterone again in 4 months, will discuss with patient again about treatment which continues to be low  Obesity (BMI 35 0-39 9 without comorbidity)  Discussed importance of diet and exercise  Educated to cut down on carbohydrates as well as fatty food  Discussed to start exercise regimen at least moderate activity 30 min daily 5 times a week as tolerated    Benign essential hypertension  Blood pressure is controlled today on current management  BP Readings from Last 3 Encounters:   01/17/19 130/88   01/03/19 150/90   10/24/18 145/80       Elevated liver function tests  Lab Results   Component Value Date    ALT 50 12/13/2018    AST 18 12/13/2018    GGT 29 09/15/2016    ALKPHOS 74 12/13/2018    BILITOT 0 6 09/07/2017     Secondary to fatty liver, discuss importance of weight loss, and diet  Based on his A1c will have to start him on metformin  Discussed with patient    Sleep apnea -  Was evaluated by sleep specialist    Pre diabetes-  Educated about starting metformin, patient does not want to take medication at this time  Educated about lifestyle modifications, weight loss diet and exercise    Discussed with the patient and all questioned fully answered  He will call me if any problems arise      Counseled patient on diagnostic results, prognosis, risk and benefit of treatment options, instruction for management, importance of treatment compliance, Risk  factor reduction and impressions      Gerardo Song MD

## 2019-01-17 NOTE — PATIENT INSTRUCTIONS
Continue vitamin-D supplementation 5000 International Units daily  Follow-up in 4 months with blood work

## 2019-01-25 DIAGNOSIS — I10 ESSENTIAL HYPERTENSION: ICD-10-CM

## 2019-01-25 RX ORDER — OLMESARTAN MEDOXOMIL AND HYDROCHLOROTHIAZIDE 20/12.5 20; 12.5 MG/1; MG/1
1 TABLET ORAL DAILY
Qty: 90 TABLET | Refills: 0 | Status: SHIPPED | OUTPATIENT
Start: 2019-01-25 | End: 2019-04-10 | Stop reason: SDUPTHER

## 2019-03-20 ENCOUNTER — TRANSCRIBE ORDERS (OUTPATIENT)
Dept: ADMINISTRATIVE | Facility: HOSPITAL | Age: 64
End: 2019-03-20

## 2019-03-20 DIAGNOSIS — G47.30 SLEEP APNEA, UNSPECIFIED TYPE: Primary | ICD-10-CM

## 2019-03-28 ENCOUNTER — OFFICE VISIT (OUTPATIENT)
Dept: SLEEP CENTER | Facility: CLINIC | Age: 64
End: 2019-03-28
Payer: COMMERCIAL

## 2019-03-28 VITALS — SYSTOLIC BLOOD PRESSURE: 110 MMHG | DIASTOLIC BLOOD PRESSURE: 80 MMHG | BODY MASS INDEX: 35.24 KG/M2 | WEIGHT: 225 LBS

## 2019-03-28 DIAGNOSIS — G47.30 SLEEP APNEA, UNSPECIFIED TYPE: ICD-10-CM

## 2019-03-28 DIAGNOSIS — G47.33 OSA ON CPAP: Primary | ICD-10-CM

## 2019-03-28 DIAGNOSIS — Z99.89 OSA ON CPAP: Primary | ICD-10-CM

## 2019-03-28 PROCEDURE — 99213 OFFICE O/P EST LOW 20 MIN: CPT | Performed by: PSYCHIATRY & NEUROLOGY

## 2019-03-28 NOTE — PATIENT INSTRUCTIONS
Recommendations:    1) APAP at 6-12cm  Get a new mask from [employer]  2) Safe driving reviewed  3) Follow-up 3 months  4) Call with any questions or concerns  5) sleep extension to 6-8 hours

## 2019-03-28 NOTE — PROGRESS NOTES
Sleep Medicine Follow-Up Note    HPI: 59yo M with ANN MARIE, GERD, and HTN who is being seen for a follow up visit  Treatment Summary: HST done in 2019 showed an VENAKTA of 7 2  Recommendations were made for weight loss/ OA/ upper airway surgery/ CPAP  He started CPAP a few months ago  HPI:   Today, patient presents unaccompanied  He is here for a compliance visit  He stated that he is finding improvement with the CPAP  He finds that he tosses and turns a lot  He is having some irritation from the nasal mask and had a scab under his nose  He has the [employer] nasal mask  he is seeing improvement in his daily functioning but his work is still stressful because of long hours and shifting schedules from 7-3pm the be off of work for 2 days and have 3-11pm  He works 16 hours a day many days  He denied sleepiness while driving  He has been drowsy and dozing off at the computer and finds that he is drinking a lot of coffee to help        Treatment: APAP  -Pressure: 6-16cm   -Pressure intolerance: denied    -Aerophagia: denied   -Air Hunger: he sometimes feels that his pressure is not enough when he initially puts it on but when he fall asleep he has no issue     -Interface: nasal mask  -Fit: irritates his nose  -Chin strap: no  -HCC: [facility]  -Patient's perceived outcome: finds it is helping him stay asleep    Compliance Card Data:    - Date Range: 19-3/27/19   - Settin-16 cm   - Pressure: Median 6 8cm; 95th%ile = 8cm   - Residual AHI: 0 6   - Vibratory Snore Index: 1 5   - %  Night in Large Leak: 0 sec   - Average usage days used: 5h 56 min   - % Days used: 100%   - % Days with Usage > 4 hrs: 87%    Respiratory:  -Ongoing Snoring: denied  -Mouth Breathing: no  -Dry Mouth: no  -Nocturnal Gasping: no    ROS:   Genitourinary none   Cardiology none   Gastrointestinal none   Neurology frequent headaches and awaken with headache   Constitutional fatigue and weight change   Integumentary none   Psychiatry none Musculoskeletal none   Pulmonary none   ENT throat clearing   Endocrine none   Hematological none       Sleep Pattern:  -Position: side  -Bedtime: 930pm or early morning if he works a double shift  -Lights Out: same time  -Environment: no Lights/TV  -Latency: mins to hours  -Awakenings: 0-1  -Wake Time: void  -Rise Time: vaies  -Patient's estimate of Sleep Time: 4 5-5h    Daytime Symptoms:  -Upon Awakening: not too bad  Somewhat rested  -Daytime fatigue/sleepiness: tiredness when he is on his 16 hour shift mainly  -Naps: none  -Involuntary Dozing: sometimes at work at the computer  -Cognitive Symptoms: a little trouble with focus and memory when tired  -Driving: Difficulty with sleepiness and driving:  denied   -- Close calls related to sleepiness: a few times on his way home he has become drowsy and hit the [place]  He then pulls over and walks around  -- Accidents related to sleepiness: denied    Substance Use:  -Caffeine: yes, has workout drink during the day and an XL cup of coffee  -Alcohol: occasionally will have some wine  -THC: denied    --> Denies any significant medical changes since last visit  --> Supplemental Oxygen Use: denies        PE:      General:  In NAD  Pul: Respirations: unlaboured  MS: No atrophy  Neuro: No resting tremor  Gait normal turning & station; unremarkable overall  Psych: Socially appropriate  Pleasant  No overt dysphoria  Assessment: The patient has been compliant with his CPAP and his obstructive events are well controlled with a residual AHI 0 6  He is deriving benefit from using his CPAP as he is less tired than he was in the past and no longer snoring  Has residual sleepiness and tiredness during the day, not likely ANN MARIE related  He works extended hours with variable shifts, making his hours of sleep decreased  Will adjust the pressure today according to download  Mask irritating his nose  Will recommend a nasal pillow mask       Recommendations:    1) APAP at 6-12cm  Get a nasal pillow mask from [employer]  2) Safe driving reviewed  3) Follow-up 3 months  4) Call with any questions or concerns  5) sleep extension to 7-8 hour a night      All questions answered for the patient, who indicated understanding and agreed with the plan       Sally Christian MD  Psychiatry/ Sleep medicine

## 2019-03-29 ENCOUNTER — TELEPHONE (OUTPATIENT)
Dept: SLEEP CENTER | Facility: CLINIC | Age: 64
End: 2019-03-29

## 2019-04-10 ENCOUNTER — OFFICE VISIT (OUTPATIENT)
Dept: FAMILY MEDICINE CLINIC | Facility: CLINIC | Age: 64
End: 2019-04-10
Payer: COMMERCIAL

## 2019-04-10 VITALS
SYSTOLIC BLOOD PRESSURE: 126 MMHG | BODY MASS INDEX: 35.19 KG/M2 | OXYGEN SATURATION: 96 % | RESPIRATION RATE: 16 BRPM | WEIGHT: 224.2 LBS | HEART RATE: 65 BPM | DIASTOLIC BLOOD PRESSURE: 76 MMHG | TEMPERATURE: 98.2 F | HEIGHT: 67 IN

## 2019-04-10 DIAGNOSIS — J98.01 COUGH DUE TO BRONCHOSPASM: ICD-10-CM

## 2019-04-10 DIAGNOSIS — I10 ESSENTIAL HYPERTENSION: ICD-10-CM

## 2019-04-10 DIAGNOSIS — E66.9 OBESITY (BMI 30-39.9): ICD-10-CM

## 2019-04-10 DIAGNOSIS — G47.26 SHIFT WORK SLEEP DISORDER: ICD-10-CM

## 2019-04-10 DIAGNOSIS — J01.00 ACUTE NON-RECURRENT MAXILLARY SINUSITIS: Primary | ICD-10-CM

## 2019-04-10 PROCEDURE — 3078F DIAST BP <80 MM HG: CPT | Performed by: FAMILY MEDICINE

## 2019-04-10 PROCEDURE — 3074F SYST BP LT 130 MM HG: CPT | Performed by: FAMILY MEDICINE

## 2019-04-10 PROCEDURE — 99214 OFFICE O/P EST MOD 30 MIN: CPT | Performed by: FAMILY MEDICINE

## 2019-04-10 RX ORDER — OLMESARTAN MEDOXOMIL AND HYDROCHLOROTHIAZIDE 20/12.5 20; 12.5 MG/1; MG/1
1 TABLET ORAL DAILY
Qty: 90 TABLET | Refills: 1 | Status: SHIPPED | OUTPATIENT
Start: 2019-04-10 | End: 2020-03-06 | Stop reason: SDUPTHER

## 2019-04-10 RX ORDER — FLUTICASONE PROPIONATE 110 UG/1
2 AEROSOL, METERED RESPIRATORY (INHALATION) 2 TIMES DAILY
Qty: 1 INHALER | Refills: 1 | Status: SHIPPED | OUTPATIENT
Start: 2019-04-10 | End: 2019-05-02 | Stop reason: SDUPTHER

## 2019-04-10 RX ORDER — AMOXICILLIN AND CLAVULANATE POTASSIUM 875; 125 MG/1; MG/1
1 TABLET, FILM COATED ORAL EVERY 12 HOURS SCHEDULED
Qty: 20 TABLET | Refills: 0 | Status: SHIPPED | OUTPATIENT
Start: 2019-04-10 | End: 2019-04-20

## 2019-04-10 RX ORDER — VERAPAMIL HYDROCHLORIDE 240 MG/1
240 TABLET, FILM COATED, EXTENDED RELEASE ORAL
Qty: 90 TABLET | Refills: 1 | Status: SHIPPED | OUTPATIENT
Start: 2019-04-10 | End: 2019-12-30 | Stop reason: SDUPTHER

## 2019-04-15 ENCOUNTER — DOCUMENTATION (OUTPATIENT)
Dept: FAMILY MEDICINE CLINIC | Facility: CLINIC | Age: 64
End: 2019-04-15

## 2019-04-15 ENCOUNTER — OFFICE VISIT (OUTPATIENT)
Dept: FAMILY MEDICINE CLINIC | Facility: CLINIC | Age: 64
End: 2019-04-15
Payer: COMMERCIAL

## 2019-04-15 VITALS
TEMPERATURE: 97.3 F | HEIGHT: 67 IN | RESPIRATION RATE: 16 BRPM | SYSTOLIC BLOOD PRESSURE: 132 MMHG | HEART RATE: 72 BPM | WEIGHT: 224.8 LBS | BODY MASS INDEX: 35.28 KG/M2 | OXYGEN SATURATION: 98 % | DIASTOLIC BLOOD PRESSURE: 80 MMHG

## 2019-04-15 DIAGNOSIS — J32.9 SINUSITIS, UNSPECIFIED CHRONICITY, UNSPECIFIED LOCATION: Primary | ICD-10-CM

## 2019-04-15 PROCEDURE — 99213 OFFICE O/P EST LOW 20 MIN: CPT | Performed by: INTERNAL MEDICINE

## 2019-04-15 RX ORDER — CEFPROZIL 500 MG/1
500 TABLET, FILM COATED ORAL 2 TIMES DAILY
Qty: 10 TABLET | Refills: 0 | Status: SHIPPED | OUTPATIENT
Start: 2019-04-15 | End: 2019-04-20

## 2019-04-20 DIAGNOSIS — I10 ESSENTIAL HYPERTENSION: ICD-10-CM

## 2019-04-20 RX ORDER — VERAPAMIL HYDROCHLORIDE 240 MG/1
240 TABLET, FILM COATED, EXTENDED RELEASE ORAL
Qty: 90 TABLET | Refills: 1 | Status: SHIPPED | OUTPATIENT
Start: 2019-04-20 | End: 2019-10-15

## 2019-05-02 DIAGNOSIS — J98.01 COUGH DUE TO BRONCHOSPASM: ICD-10-CM

## 2019-05-03 DIAGNOSIS — J98.01 COUGH DUE TO BRONCHOSPASM: ICD-10-CM

## 2019-05-03 RX ORDER — DEXAMETHASONE 4 MG/1
TABLET ORAL
Qty: 12 INHALER | Refills: 0 | Status: SHIPPED | OUTPATIENT
Start: 2019-05-03 | End: 2019-06-03

## 2019-05-05 RX ORDER — DEXAMETHASONE 4 MG/1
TABLET ORAL
Qty: 12 INHALER | Refills: 0 | OUTPATIENT
Start: 2019-05-05

## 2019-06-03 ENCOUNTER — OFFICE VISIT (OUTPATIENT)
Dept: FAMILY MEDICINE CLINIC | Facility: CLINIC | Age: 64
End: 2019-06-03
Payer: COMMERCIAL

## 2019-06-03 VITALS
OXYGEN SATURATION: 97 % | SYSTOLIC BLOOD PRESSURE: 144 MMHG | RESPIRATION RATE: 16 BRPM | HEART RATE: 66 BPM | HEIGHT: 67 IN | DIASTOLIC BLOOD PRESSURE: 82 MMHG | BODY MASS INDEX: 35.31 KG/M2 | TEMPERATURE: 98 F | WEIGHT: 225 LBS

## 2019-06-03 DIAGNOSIS — M54.16 LUMBAR BACK PAIN WITH RADICULOPATHY AFFECTING RIGHT LOWER EXTREMITY: Primary | ICD-10-CM

## 2019-06-03 PROCEDURE — 3008F BODY MASS INDEX DOCD: CPT | Performed by: INTERNAL MEDICINE

## 2019-06-03 PROCEDURE — 99213 OFFICE O/P EST LOW 20 MIN: CPT | Performed by: INTERNAL MEDICINE

## 2019-06-03 RX ORDER — CYCLOBENZAPRINE HCL 10 MG
10 TABLET ORAL
Qty: 15 TABLET | Refills: 0 | Status: SHIPPED | OUTPATIENT
Start: 2019-06-03 | End: 2019-10-15

## 2019-06-03 RX ORDER — TRAMADOL HYDROCHLORIDE 50 MG/1
50 TABLET ORAL EVERY 6 HOURS PRN
Qty: 20 TABLET | Refills: 0 | Status: SHIPPED | OUTPATIENT
Start: 2019-06-03 | End: 2019-10-15

## 2019-06-03 RX ORDER — METHYLPREDNISOLONE 4 MG/1
TABLET ORAL
Qty: 21 EACH | Refills: 0 | Status: SHIPPED | OUTPATIENT
Start: 2019-06-03 | End: 2019-10-15

## 2019-06-03 RX ORDER — MELOXICAM 15 MG/1
15 TABLET ORAL DAILY
Qty: 30 TABLET | Refills: 1 | Status: SHIPPED | OUTPATIENT
Start: 2019-06-03 | End: 2019-08-06 | Stop reason: SDUPTHER

## 2019-08-06 DIAGNOSIS — M54.16 LUMBAR BACK PAIN WITH RADICULOPATHY AFFECTING RIGHT LOWER EXTREMITY: ICD-10-CM

## 2019-08-06 RX ORDER — MELOXICAM 15 MG/1
TABLET ORAL
Qty: 30 TABLET | Refills: 1 | Status: SHIPPED | OUTPATIENT
Start: 2019-08-06 | End: 2019-10-15

## 2019-10-03 ENCOUNTER — OFFICE VISIT (OUTPATIENT)
Dept: SLEEP CENTER | Facility: CLINIC | Age: 64
End: 2019-10-03
Payer: COMMERCIAL

## 2019-10-03 VITALS
DIASTOLIC BLOOD PRESSURE: 70 MMHG | HEIGHT: 67 IN | BODY MASS INDEX: 35.47 KG/M2 | WEIGHT: 226 LBS | SYSTOLIC BLOOD PRESSURE: 116 MMHG

## 2019-10-03 DIAGNOSIS — G47.09 OTHER INSOMNIA: ICD-10-CM

## 2019-10-03 DIAGNOSIS — G47.33 OSA (OBSTRUCTIVE SLEEP APNEA): Primary | ICD-10-CM

## 2019-10-03 PROCEDURE — 99213 OFFICE O/P EST LOW 20 MIN: CPT | Performed by: PSYCHIATRY & NEUROLOGY

## 2019-10-03 NOTE — PATIENT INSTRUCTIONS
Recommendations:    1) APAP at 6-12cm  PAP and sleep extension 7-8 hours of sleep  2) Safe driving reviewed  3) Follow-up 6 months  4) Call with any questions or concerns    5) decrease caffeine intake to no later than 12pm

## 2019-10-03 NOTE — PROGRESS NOTES
Sleep Medicine Follow-Up Note    HPI: 59yo M with ANN MARIE, GERD, and HTN who is being seen for a follow up visit  Treatment Summary: HST done in 2019 showed an VENKATA of 7 2  Recommendations were made for weight loss/ OA/ upper airway surgery/ CPAP  He started CPAP a few months ago  HPI:   Today, patient presents unaccompanied  He is here for a compliance visit  He stated that he is finding improvement with the CPAP, but he is not feeling refreshed when he wakes up  He has a shifting schedules from 7-3pm the be off of work for 2 days and have 3-11pm  He works 16 hours a day many days  He doesn't have enough time to sleep more than 5-6 hours a night  He is unable to cut down his work hours at this time  He denied sleepiness while driving  He has been drowsy and dozing off at the computer sade home and finds that he is drinking a lot of coffee to help  He drinks at least 1 pot of coffee a day  He is also waking up sometimes once but then has a hard time falling asleep         Treatment: APAP  -Pressure: 6-12cm   -Pressure intolerance: denied    -Aerophagia: denied   -Air Hunger: no  -Interface: nasal mask  -Fit: good  -Chin strap: no  -HCC: [facility]  -Patient's perceived outcome: finds it is helping him stay asleep    Compliance Card Data:    - Date Range: 19-19   - Settin-112 cm   - Pressure: Median 6 7cm; 95th%ile = 8 2cm   - Residual AHI: 1   - %  Night in Large Leak: 2 sec   - Average usage days used: 5h 54 min   - % Days used: 100%   - % Days with Usage > 4 hrs: 87%    Respiratory:  -Ongoing Snoring: denied  -Mouth Breathing: no  -Dry Mouth: no  -Nocturnal Gasping: no    ROS:   Genitourinary none   Cardiology none   Gastrointestinal none   Neurology awaken with headache, forgetfulness and poor concentration or confusion,    Constitutional fatigue   Integumentary none   Psychiatry none   Musculoskeletal none   Pulmonary none   ENT none   Endocrine none   Hematological none       Sleep Pattern:  -Position: side  -Bedtime: varies depending on if he is working a double shift  If he works days without overtime, he is in bed by 930pm  -Lights Out: same time  -Environment: no Lights/TV  -Latency: mins to hours  -Awakenings: 0-1  -Wake Time: void  -Rise Time: varies  -Patient's estimate of Sleep Time: 5-5 5h    Daytime Symptoms:  -Upon Awakening: like he is not resterd  -Daytime fatigue/sleepiness: tiredness  -Naps: sometimes before he goes home from work  -Involuntary Dozing: sometimes at work at the computer  -Cognitive Symptoms: trouble with focus and memory when tired  -Driving: Difficulty with sleepiness and driving:  denied   -- Close calls related to sleepiness: denied   -- Accidents related to sleepiness: denied    Substance Use:  -Caffeine: yes, has workout drink during the day and an XL cup of coffee  At least a pot of coffee a day  -Alcohol: occasionally will have some wine  -THC: denied    --> Denies any significant medical changes since last visit  --> Supplemental Oxygen Use: denies    Questionnaire:    Sitting and reading: High chance of dozing  Watching TV: Moderate chance of dozing  Sitting, inactive in a public place (e g  a theatre or a meeting): Would never doze  As a passenger in a car for an hour without a break: High chance of dozing  Lying down to rest in the afternoon when circumstances permit: Would never doze  Sitting and talking to someone: Would never doze  Sitting quietly after a lunch without alcohol: Would never doze  In a car, while stopped for a few minutes in traffic: Would never doze  Total score: 8      PE:  /70   Ht 5' 7" (1 702 m)   Wt 103 kg (226 lb)   BMI 35 40 kg/m²       General:  In NAD  Pul: Respirations: unlaboured  MS: No atrophy  Neuro: No resting tremor  Gait normal turning & station; unremarkable overall  Psych: Socially appropriate  Pleasant  No overt dysphoria           Assessment: The patient has been compliant with his CPAP and his obstructive events are well controlled with a residual AHI 1  He is deriving benefit from using his CPAP as he is no longer snoring  Has residual sleepiness and tiredness during the day, not likely ANN MARIE related  He works extended hours with variable shifts, making his hours of sleep decreased to 5 hours a night  Will not adjust the pressure today  Extended sleep times and caffeine reduction  Recommendations:    1) APAP at 6-12cm  PAP and sleep extension 7-8 hours of sleep  2) Safe driving reviewed  3) Follow-up 6 months  4) Call with any questions or concerns  5) decrease caffeine intake to no later than 12pm     All questions answered for the patient, who indicated understanding and agreed with the plan       Harvinder Green MD  Psychiatry/ Sleep medicine

## 2019-10-12 ENCOUNTER — APPOINTMENT (OUTPATIENT)
Dept: LAB | Facility: MEDICAL CENTER | Age: 64
End: 2019-10-12
Payer: COMMERCIAL

## 2019-10-12 DIAGNOSIS — I10 ESSENTIAL HYPERTENSION: ICD-10-CM

## 2019-10-12 DIAGNOSIS — R73.03 PREDIABETES: ICD-10-CM

## 2019-10-12 DIAGNOSIS — R79.89 LOW TESTOSTERONE IN MALE: ICD-10-CM

## 2019-10-12 DIAGNOSIS — E55.9 VITAMIN D DEFICIENCY: ICD-10-CM

## 2019-10-12 LAB
25(OH)D3 SERPL-MCNC: 36.7 NG/ML (ref 30–100)
ALBUMIN SERPL BCP-MCNC: 4.3 G/DL (ref 3.5–5)
ALP SERPL-CCNC: 73 U/L (ref 46–116)
ALT SERPL W P-5'-P-CCNC: 88 U/L (ref 12–78)
ANION GAP SERPL CALCULATED.3IONS-SCNC: 8 MMOL/L (ref 4–13)
AST SERPL W P-5'-P-CCNC: 28 U/L (ref 5–45)
BILIRUB DIRECT SERPL-MCNC: 0.15 MG/DL (ref 0–0.2)
BILIRUB SERPL-MCNC: 0.42 MG/DL (ref 0.2–1)
BUN SERPL-MCNC: 13 MG/DL (ref 5–25)
CALCIUM SERPL-MCNC: 8.9 MG/DL (ref 8.3–10.1)
CHLORIDE SERPL-SCNC: 110 MMOL/L (ref 100–108)
CHOLEST SERPL-MCNC: 117 MG/DL (ref 50–200)
CO2 SERPL-SCNC: 27 MMOL/L (ref 21–32)
CREAT SERPL-MCNC: 0.91 MG/DL (ref 0.6–1.3)
EST. AVERAGE GLUCOSE BLD GHB EST-MCNC: 103 MG/DL
GFR SERPL CREATININE-BSD FRML MDRD: 89 ML/MIN/1.73SQ M
GLUCOSE P FAST SERPL-MCNC: 78 MG/DL (ref 65–99)
HBA1C MFR BLD: 5.2 % (ref 4.2–6.3)
HDLC SERPL-MCNC: 36 MG/DL (ref 40–60)
LDLC SERPL CALC-MCNC: 64 MG/DL (ref 0–100)
NONHDLC SERPL-MCNC: 81 MG/DL
POTASSIUM SERPL-SCNC: 3.9 MMOL/L (ref 3.5–5.3)
PROT SERPL-MCNC: 7.2 G/DL (ref 6.4–8.2)
SODIUM SERPL-SCNC: 145 MMOL/L (ref 136–145)
TRIGL SERPL-MCNC: 83 MG/DL

## 2019-10-12 PROCEDURE — 36415 COLL VENOUS BLD VENIPUNCTURE: CPT

## 2019-10-12 PROCEDURE — 84403 ASSAY OF TOTAL TESTOSTERONE: CPT

## 2019-10-12 PROCEDURE — 82248 BILIRUBIN DIRECT: CPT

## 2019-10-12 PROCEDURE — 82306 VITAMIN D 25 HYDROXY: CPT

## 2019-10-12 PROCEDURE — 80053 COMPREHEN METABOLIC PANEL: CPT

## 2019-10-12 PROCEDURE — 84402 ASSAY OF FREE TESTOSTERONE: CPT

## 2019-10-12 PROCEDURE — 83036 HEMOGLOBIN GLYCOSYLATED A1C: CPT

## 2019-10-12 PROCEDURE — 80061 LIPID PANEL: CPT

## 2019-10-14 LAB
TESTOST FREE SERPL-MCNC: 4.8 PG/ML (ref 6.6–18.1)
TESTOST SERPL-MCNC: 224 NG/DL (ref 264–916)

## 2019-10-15 ENCOUNTER — OFFICE VISIT (OUTPATIENT)
Dept: FAMILY MEDICINE CLINIC | Facility: CLINIC | Age: 64
End: 2019-10-15
Payer: COMMERCIAL

## 2019-10-15 VITALS
WEIGHT: 225 LBS | OXYGEN SATURATION: 98 % | TEMPERATURE: 97.2 F | SYSTOLIC BLOOD PRESSURE: 152 MMHG | RESPIRATION RATE: 16 BRPM | BODY MASS INDEX: 35.31 KG/M2 | HEART RATE: 67 BPM | HEIGHT: 67 IN | DIASTOLIC BLOOD PRESSURE: 92 MMHG

## 2019-10-15 DIAGNOSIS — I10 ESSENTIAL HYPERTENSION: Primary | ICD-10-CM

## 2019-10-15 DIAGNOSIS — Z12.11 SCREEN FOR COLON CANCER: ICD-10-CM

## 2019-10-15 DIAGNOSIS — K76.0 FATTY LIVER: ICD-10-CM

## 2019-10-15 PROCEDURE — 3008F BODY MASS INDEX DOCD: CPT | Performed by: INTERNAL MEDICINE

## 2019-10-15 PROCEDURE — 99214 OFFICE O/P EST MOD 30 MIN: CPT | Performed by: INTERNAL MEDICINE

## 2019-10-15 RX ORDER — OLMESARTAN MEDOXOMIL AND HYDROCHLOROTHIAZIDE 40/12.5 40; 12.5 MG/1; MG/1
1 TABLET ORAL DAILY
Qty: 30 TABLET | Refills: 1 | Status: SHIPPED | OUTPATIENT
Start: 2019-10-15 | End: 2019-11-14 | Stop reason: SDUPTHER

## 2019-10-15 NOTE — PROGRESS NOTES
Assessment/Plan:         Diagnoses and all orders for this visit:    Essential hypertension  Comments:  adjust med and rto 1 month  Orders:  -     olmesartan-hydrochlorothiazide (BENICAR HCT) 40-12 5 MG per tablet; Take 1 tablet by mouth daily  -     Basic metabolic panel; Future    Fatty liver  Comments:  on u/s  rec reduce    Screen for colon cancer  Comments:  due for colo  Orders:  -     Ambulatory referral to Colorectal Surgery; Future          Subjective:      Patient ID: Beltran Nova is a 59 y o  male  Pt here to go over lab  He is also sleep deprived  Saw sleep dr and told no rafael  Denied cp/sob/h/a      The following portions of the patient's history were reviewed and updated as appropriate: He  has a past medical history of Sutherland's esophagus, Elevated liver function tests, GERD (gastroesophageal reflux disease), Helicobacter pylori (H  pylori) infection, Hypertension, and Lyme disease  He   Patient Active Problem List    Diagnosis Date Noted    Snoring 10/24/2018    Other insomnia 10/24/2018    Headache upon awakening 10/24/2018    Hypersomnia 10/24/2018    Obesity (BMI 30-39 9) 10/24/2018    Low testosterone in male 10/24/2018    Shift work sleep disorder 10/24/2018    Fatty liver 10/10/2018    GERD without esophagitis 09/14/2016    Elevated liver function tests 03/09/2016    Essential hypertension 04/29/2015     He  has a past surgical history that includes Hernia repair; Bony pelvis surgery; and pr esophagogastroduodenoscopy transoral diagnostic (N/A, 10/18/2017)  His family history includes Hypotension in his mother; Intracerebral hemorrhage in his father; Stroke in his father  He  reports that he has never smoked  He has never used smokeless tobacco  He reports that he drinks alcohol  He reports that he does not use drugs    Current Outpatient Medications   Medication Sig Dispense Refill    Ascorbic Acid (VITAMIN C) 100 MG tablet Take 100 mg by mouth daily      Cholecalciferol (CVS D3) 5000 units capsule Take 1 capsule by mouth daily      Glucosamine-Chondroitin 4010-4318 MG/30ML LIQD Take by mouth      Magnesium Gluconate (MAGNESIUM 27 PO) Take by mouth      multivitamin (THERAGRAN) TABS Take 1 tablet by mouth daily      Nutritional Supplements (CREATINE) 750 MG CAPS Take by mouth      olmesartan-hydrochlorothiazide (BENICAR HCT) 20-12 5 MG per tablet Take 1 tablet by mouth daily 90 tablet 1    sildenafil (REVATIO) 20 mg tablet TAKE 2-5 TABLETS BY MOUTH AS NEEDED FOR SEXUAL ACTIVITY 50 tablet 3    verapamil (CALAN-SR) 240 mg CR tablet Take 1 tablet (240 mg total) by mouth daily at bedtime 90 tablet 1    vitamin E, tocopherol, 400 units capsule Take by mouth      olmesartan-hydrochlorothiazide (BENICAR HCT) 40-12 5 MG per tablet Take 1 tablet by mouth daily 30 tablet 1     No current facility-administered medications for this visit  Current Outpatient Medications on File Prior to Visit   Medication Sig    Ascorbic Acid (VITAMIN C) 100 MG tablet Take 100 mg by mouth daily    Cholecalciferol (CVS D3) 5000 units capsule Take 1 capsule by mouth daily    Glucosamine-Chondroitin 9326-6535 MG/30ML LIQD Take by mouth    Magnesium Gluconate (MAGNESIUM 27 PO) Take by mouth    multivitamin (THERAGRAN) TABS Take 1 tablet by mouth daily    Nutritional Supplements (CREATINE) 750 MG CAPS Take by mouth    olmesartan-hydrochlorothiazide (BENICAR HCT) 20-12 5 MG per tablet Take 1 tablet by mouth daily    sildenafil (REVATIO) 20 mg tablet TAKE 2-5 TABLETS BY MOUTH AS NEEDED FOR SEXUAL ACTIVITY    verapamil (CALAN-SR) 240 mg CR tablet Take 1 tablet (240 mg total) by mouth daily at bedtime    vitamin E, tocopherol, 400 units capsule Take by mouth     No current facility-administered medications on file prior to visit  He has No Known Allergies       Review of Systems   Constitutional: Negative  HENT: Negative  Respiratory: Negative  Cardiovascular: Negative  Gastrointestinal: Negative  Objective:      /92 (BP Location: Right arm, Patient Position: Sitting, Cuff Size: Large)   Pulse 67   Temp (!) 97 2 °F (36 2 °C) (Tympanic)   Resp 16   Ht 5' 7" (1 702 m)   Wt 102 kg (225 lb)   SpO2 98%   BMI 35 24 kg/m²          Physical Exam   Constitutional: He appears well-developed and well-nourished  No distress  HENT:   Head: Normocephalic and atraumatic  Right Ear: External ear normal    Left Ear: External ear normal    Nose: Nose normal    Mouth/Throat: Oropharynx is clear and moist  No oropharyngeal exudate  Neck: Normal range of motion  Neck supple  No thyromegaly present  Cardiovascular: Normal rate, regular rhythm, normal heart sounds and intact distal pulses  Exam reveals no gallop and no friction rub  No murmur heard  Pulmonary/Chest: Effort normal and breath sounds normal  No respiratory distress  He has no wheezes  He has no rales  He exhibits no tenderness  Abdominal: Soft  Bowel sounds are normal  He exhibits no distension and no mass  Lymphadenopathy:     He has no cervical adenopathy  Skin: He is not diaphoretic

## 2019-10-18 ENCOUNTER — TELEPHONE (OUTPATIENT)
Dept: ENDOCRINOLOGY | Facility: CLINIC | Age: 64
End: 2019-10-18

## 2019-10-18 NOTE — TELEPHONE ENCOUNTER
Patient informed of blood work results and verbally understands to continue lifestyle modification  Patient advised to schedule follow up appt  Patient will call back on Monday after reviewing work schedule

## 2019-10-18 NOTE — TELEPHONE ENCOUNTER
----- Message from Irina Kunz MD sent at 10/18/2019  1:08 PM EDT -----  Please call the patient regarding his abnormal result    His recent total testosterone level was low, he should continue lifestyle modification, he will also need appointment for follow-up

## 2019-11-04 ENCOUNTER — TELEPHONE (OUTPATIENT)
Dept: GASTROENTEROLOGY | Facility: CLINIC | Age: 64
End: 2019-11-04

## 2019-11-04 NOTE — TELEPHONE ENCOUNTER
11/04/19  Screened by: Polina Borja    Referring Provider dr Angelita Campbell: There is no height or weight on file to calculate BMI  Has patient been referred for a routine screening Colonoscopy? no  Is the patient between 39-70 years old? no    SCHEDULING STAFF   If the patient is between 45yrs-49yrs, please advise patient to confirm benefits/coverage with their insurance company for a routine screening colonoscopy, some insurance carriers will only cover at [facility] or older   If the patient is over 66years old, please schedule an office visit  Do you have any of the following symptoms?  no    Have you had a coronary or vascular stent within the last year? no    Have you had a heart attack or stroke in the last 6 months? no    Have you had intestinal surgery in the last 3 months? no    Do you have problems with: no    Do you use: no    Have you been hospitalized in the last Month? no    Have you been diagnosed with a bleeding disorder or anemia? no    Have you had chest pain (angina) or breathing problems  (COPD) in the last 3 months? no    Do you have any difficulty walking up a flight of stairs? no    Have you had Kidney failure or insufficiency? no    Have you had heart valve surgery? no    Are you confined to a wheelchair? no    Do you take no    Have you been diagnosed with Diabetes or are you taking any   Diabetic medications? no    : If patient answers NO to medical questions, then schedule procedure  If patient answers YES to medical questions, then schedule office appointment  Previous Colonoscopy yes  Date and Facility of last colonoscopy?  6 years ago    Comments:

## 2019-11-14 ENCOUNTER — LAB REQUISITION (OUTPATIENT)
Dept: LAB | Facility: HOSPITAL | Age: 64
End: 2019-11-14
Payer: COMMERCIAL

## 2019-11-14 DIAGNOSIS — K63.5 POLYP OF COLON: ICD-10-CM

## 2019-11-14 DIAGNOSIS — K21.9 GERD WITHOUT ESOPHAGITIS: Primary | ICD-10-CM

## 2019-11-14 DIAGNOSIS — K22.70 BARRETT'S ESOPHAGUS WITHOUT DYSPLASIA: ICD-10-CM

## 2019-11-14 DIAGNOSIS — I10 ESSENTIAL HYPERTENSION: ICD-10-CM

## 2019-11-14 DIAGNOSIS — K90.1 TROPICAL SPRUE: ICD-10-CM

## 2019-11-14 PROCEDURE — 88305 TISSUE EXAM BY PATHOLOGIST: CPT | Performed by: PATHOLOGY

## 2019-11-14 RX ORDER — PANTOPRAZOLE SODIUM 40 MG/1
40 TABLET, DELAYED RELEASE ORAL DAILY
Qty: 30 TABLET | Refills: 2 | Status: SHIPPED | OUTPATIENT
Start: 2019-11-14 | End: 2020-02-18

## 2019-11-15 RX ORDER — OLMESARTAN MEDOXOMIL AND HYDROCHLOROTHIAZIDE 40/12.5 40; 12.5 MG/1; MG/1
TABLET ORAL
Qty: 30 TABLET | Refills: 1 | Status: SHIPPED | OUTPATIENT
Start: 2019-11-15 | End: 2019-12-14 | Stop reason: SDUPTHER

## 2019-11-22 ENCOUNTER — APPOINTMENT (OUTPATIENT)
Dept: LAB | Facility: MEDICAL CENTER | Age: 64
End: 2019-11-22
Payer: COMMERCIAL

## 2019-11-22 DIAGNOSIS — I10 ESSENTIAL HYPERTENSION: ICD-10-CM

## 2019-11-22 LAB
ANION GAP SERPL CALCULATED.3IONS-SCNC: 8 MMOL/L (ref 4–13)
BUN SERPL-MCNC: 14 MG/DL (ref 5–25)
CALCIUM SERPL-MCNC: 9.1 MG/DL (ref 8.3–10.1)
CHLORIDE SERPL-SCNC: 111 MMOL/L (ref 100–108)
CO2 SERPL-SCNC: 25 MMOL/L (ref 21–32)
CREAT SERPL-MCNC: 0.87 MG/DL (ref 0.6–1.3)
GFR SERPL CREATININE-BSD FRML MDRD: 91 ML/MIN/1.73SQ M
GLUCOSE P FAST SERPL-MCNC: 79 MG/DL (ref 65–99)
POTASSIUM SERPL-SCNC: 4 MMOL/L (ref 3.5–5.3)
SODIUM SERPL-SCNC: 144 MMOL/L (ref 136–145)

## 2019-11-22 PROCEDURE — 80048 BASIC METABOLIC PNL TOTAL CA: CPT

## 2019-11-22 PROCEDURE — 36415 COLL VENOUS BLD VENIPUNCTURE: CPT

## 2019-12-14 DIAGNOSIS — I10 ESSENTIAL HYPERTENSION: ICD-10-CM

## 2019-12-16 RX ORDER — OLMESARTAN MEDOXOMIL AND HYDROCHLOROTHIAZIDE 40/12.5 40; 12.5 MG/1; MG/1
TABLET ORAL
Qty: 30 TABLET | Refills: 1 | Status: SHIPPED | OUTPATIENT
Start: 2019-12-16 | End: 2020-01-28 | Stop reason: SDUPTHER

## 2019-12-30 DIAGNOSIS — I10 ESSENTIAL HYPERTENSION: ICD-10-CM

## 2019-12-30 RX ORDER — VERAPAMIL HYDROCHLORIDE 240 MG/1
240 TABLET, FILM COATED, EXTENDED RELEASE ORAL
Qty: 90 TABLET | Refills: 1 | Status: SHIPPED | OUTPATIENT
Start: 2019-12-30 | End: 2020-03-09 | Stop reason: SDUPTHER

## 2020-01-03 DIAGNOSIS — Z12.5 PROSTATE CANCER SCREENING: Primary | ICD-10-CM

## 2020-01-06 ENCOUNTER — APPOINTMENT (OUTPATIENT)
Dept: LAB | Facility: MEDICAL CENTER | Age: 65
End: 2020-01-06
Payer: COMMERCIAL

## 2020-01-06 DIAGNOSIS — Z12.5 PROSTATE CANCER SCREENING: ICD-10-CM

## 2020-01-06 LAB — PSA SERPL-MCNC: 1.2 NG/ML (ref 0–4)

## 2020-01-06 PROCEDURE — 36415 COLL VENOUS BLD VENIPUNCTURE: CPT

## 2020-01-06 PROCEDURE — G0103 PSA SCREENING: HCPCS

## 2020-01-08 ENCOUNTER — OFFICE VISIT (OUTPATIENT)
Dept: UROLOGY | Facility: CLINIC | Age: 65
End: 2020-01-08
Payer: COMMERCIAL

## 2020-01-08 VITALS
WEIGHT: 221.8 LBS | HEIGHT: 67 IN | DIASTOLIC BLOOD PRESSURE: 90 MMHG | HEART RATE: 80 BPM | BODY MASS INDEX: 34.81 KG/M2 | SYSTOLIC BLOOD PRESSURE: 140 MMHG

## 2020-01-08 DIAGNOSIS — Z12.5 PROSTATE CANCER SCREENING: Primary | ICD-10-CM

## 2020-01-08 PROCEDURE — 99213 OFFICE O/P EST LOW 20 MIN: CPT | Performed by: PHYSICIAN ASSISTANT

## 2020-01-08 NOTE — PROGRESS NOTES
1  Prostate cancer screening  PSA, Total Screen       Assessment and plan:       1  Low testosterone -- managed by Dr Eladia Patrick  - patient previously had an adverse reaction of hypertension with testosterone replacement therapy  He is overall comfortable with his energy levels at this time and is not interested in continued testosterone supplementation  2  Erectile dysfunction  - continue sildenafil as needed    3  Prostate cancer screening   - PSA 1 2 with normal prostate examination in the office today  Follow-up 1 year with PSA prior to visit for continued monitoring  All questions answered  Toarun Rousseau PA-C      Chief Complaint     Follow-up low testosterone    History of Present Illness     Shashi Ayers is a 59 y o  male patient of Dr Eladia Patrick with a history of erectile dysfunction and low testosterone presenting for follow up  Patient had been initiated on testosterone replacement many years ago  He discontinued testosterone replacement and then restarted AndroGel  Had issues with elevated blood pressure and therefore discontinued   Patient was evaluated with endocrinology  Recommendations were for monitoring off of TRT  Patient has been off of testosterone since April 2018  His testosterone levels have remained relatively stable, most recently 224 (10/12/2019)  Patient states that he does have some issues with low libido be toe however contributes this to his    Patient has reported history of sleep apnea and was utilizing CPAP  He follows with sleep medicine  He does acknowledge significant sleep disturbance issues likely a result of his work and sleep patterns  Most recent PSA of 1 2 (1/6/20)  Urinary Incontinence Screening      Most Recent Value   Urinary Incontinence   Urinary Incontinence? No   Incomplete emptying? No   Urinary frequency? No   Urinary urgency? No   Urinary hesitancy? No   Dysuria (painful difficult urination)?   No   Nocturia (waking up to use the bathroom)? No   Straining (having to push to go)? No   Weak stream?  No   Intermittent stream?  No   Post void dribbling? No            Laboratory     Lab Results   Component Value Date    CREATININE 0 87 11/22/2019       Lab Results   Component Value Date    PSA 1 2 01/06/2020    PSA 1 5 12/13/2018    PSA 1 6 10/22/2018       Review of Systems     Review of Systems   Constitutional: Negative for activity change, appetite change, chills, diaphoresis, fatigue, fever and unexpected weight change  Respiratory: Negative for chest tightness and shortness of breath  Cardiovascular: Negative for chest pain, palpitations and leg swelling  Gastrointestinal: Negative for abdominal distention, abdominal pain, constipation, diarrhea, nausea and vomiting  Genitourinary: Negative for decreased urine volume, difficulty urinating, dysuria, enuresis, flank pain, frequency, genital sores, hematuria and urgency  Musculoskeletal: Negative for back pain, gait problem and myalgias  Skin: Negative for color change, pallor, rash and wound  Psychiatric/Behavioral: Negative for behavioral problems  The patient is not nervous/anxious  Allergies     No Known Allergies    Physical Exam     Physical Exam   Constitutional: He is oriented to person, place, and time  He appears well-developed and well-nourished  No distress  HENT:   Head: Normocephalic and atraumatic  Eyes: Conjunctivae are normal    Neck: Normal range of motion  No tracheal deviation present  Pulmonary/Chest: Effort normal    Genitourinary:   Genitourinary Comments: Good rectal tone  Prostate 30g, smooth, symmetric, no nodules   Musculoskeletal: Normal range of motion  He exhibits no edema or deformity  Neurological: He is alert and oriented to person, place, and time  Skin: Skin is warm and dry  No rash noted  He is not diaphoretic  No erythema  Psychiatric: He has a normal mood and affect   His behavior is normal          Vital Signs     Vitals:    01/08/20 1044   BP: 140/90   BP Location: Left arm   Patient Position: Sitting   Cuff Size: Standard   Pulse: 80   Weight: 101 kg (221 lb 12 8 oz)   Height: 5' 7" (1 702 m)         Current Medications       Current Outpatient Medications:     Ascorbic Acid (VITAMIN C) 100 MG tablet, Take 100 mg by mouth daily, Disp: , Rfl:     Cholecalciferol (CVS D3) 5000 units capsule, Take 1 capsule by mouth daily, Disp: , Rfl:     Glucosamine-Chondroitin 9752-0344 MG/30ML LIQD, Take by mouth, Disp: , Rfl:     Magnesium Gluconate (MAGNESIUM 27 PO), Take by mouth, Disp: , Rfl:     multivitamin (THERAGRAN) TABS, Take 1 tablet by mouth daily, Disp: , Rfl:     Nutritional Supplements (CREATINE) 750 MG CAPS, Take by mouth, Disp: , Rfl:     olmesartan-hydrochlorothiazide (BENICAR HCT) 20-12 5 MG per tablet, Take 1 tablet by mouth daily, Disp: 90 tablet, Rfl: 1    olmesartan-hydrochlorothiazide (BENICAR HCT) 40-12 5 MG per tablet, TAKE 1 TABLET BY MOUTH EVERY DAY, Disp: 30 tablet, Rfl: 1    pantoprazole (PROTONIX) 40 mg tablet, Take 1 tablet (40 mg total) by mouth daily, Disp: 30 tablet, Rfl: 2    sildenafil (REVATIO) 20 mg tablet, TAKE 2-5 TABLETS BY MOUTH AS NEEDED FOR SEXUAL ACTIVITY, Disp: 50 tablet, Rfl: 3    verapamil (CALAN-SR) 240 mg CR tablet, Take 1 tablet (240 mg total) by mouth daily at bedtime, Disp: 90 tablet, Rfl: 1    vitamin E, tocopherol, 400 units capsule, Take by mouth, Disp: , Rfl:       Active Problems     Patient Active Problem List   Diagnosis    Essential hypertension    Elevated liver function tests    GERD without esophagitis    Fatty liver    Snoring    Other insomnia    Headache upon awakening    Hypersomnia    Obesity (BMI 30-39  9)    Low testosterone in male    Shift work sleep disorder       Past Medical History     Past Medical History:   Diagnosis Date    Sutherland's esophagus     Elevated liver function tests     GERD (gastroesophageal reflux disease)     Helicobacter pylori (H  pylori) infection     last assessed - [id number]    Hypertension     Lyme disease     last assessed - [id number]       Surgical History     Past Surgical History:   Procedure Laterality Date    BONY PELVIS SURGERY      HERNIA REPAIR      WY ESOPHAGOGASTRODUODENOSCOPY TRANSORAL DIAGNOSTIC N/A 10/18/2017    Procedure: ESOPHAGOGASTRODUODENOSCOPY (EGD); Surgeon: Vinod La MD;  Location: [facility];   Service: Gastroenterology       Family History     Family History   Problem Relation Age of Onset    Hypotension Mother     Stroke Father     Intracerebral hemorrhage Father         Cerebral hemorrhage       Social History     Social History       Radiology

## 2020-01-28 DIAGNOSIS — I10 ESSENTIAL HYPERTENSION: ICD-10-CM

## 2020-01-29 RX ORDER — OLMESARTAN MEDOXOMIL AND HYDROCHLOROTHIAZIDE 40/12.5 40; 12.5 MG/1; MG/1
1 TABLET ORAL DAILY
Qty: 30 TABLET | Refills: 0 | Status: SHIPPED | OUTPATIENT
Start: 2020-01-29 | End: 2020-03-09

## 2020-02-18 DIAGNOSIS — K21.9 GERD WITHOUT ESOPHAGITIS: ICD-10-CM

## 2020-02-18 RX ORDER — PANTOPRAZOLE SODIUM 40 MG/1
TABLET, DELAYED RELEASE ORAL
Qty: 90 TABLET | Refills: 0 | Status: SHIPPED | OUTPATIENT
Start: 2020-02-18

## 2020-03-06 DIAGNOSIS — I10 ESSENTIAL HYPERTENSION: ICD-10-CM

## 2020-03-07 RX ORDER — OLMESARTAN MEDOXOMIL AND HYDROCHLOROTHIAZIDE 20/12.5 20; 12.5 MG/1; MG/1
1 TABLET ORAL DAILY
Qty: 30 TABLET | Refills: 1 | Status: SHIPPED | OUTPATIENT
Start: 2020-03-07 | End: 2020-03-09 | Stop reason: SDUPTHER

## 2020-03-09 ENCOUNTER — OFFICE VISIT (OUTPATIENT)
Dept: FAMILY MEDICINE CLINIC | Facility: CLINIC | Age: 65
End: 2020-03-09
Payer: COMMERCIAL

## 2020-03-09 VITALS
OXYGEN SATURATION: 98 % | TEMPERATURE: 98.3 F | DIASTOLIC BLOOD PRESSURE: 82 MMHG | BODY MASS INDEX: 34.53 KG/M2 | HEIGHT: 67 IN | RESPIRATION RATE: 16 BRPM | HEART RATE: 58 BPM | WEIGHT: 220 LBS | SYSTOLIC BLOOD PRESSURE: 140 MMHG

## 2020-03-09 DIAGNOSIS — I10 ESSENTIAL HYPERTENSION: ICD-10-CM

## 2020-03-09 PROCEDURE — 3077F SYST BP >= 140 MM HG: CPT | Performed by: INTERNAL MEDICINE

## 2020-03-09 PROCEDURE — 3008F BODY MASS INDEX DOCD: CPT | Performed by: INTERNAL MEDICINE

## 2020-03-09 PROCEDURE — 1036F TOBACCO NON-USER: CPT | Performed by: INTERNAL MEDICINE

## 2020-03-09 PROCEDURE — 3079F DIAST BP 80-89 MM HG: CPT | Performed by: INTERNAL MEDICINE

## 2020-03-09 PROCEDURE — 99213 OFFICE O/P EST LOW 20 MIN: CPT | Performed by: INTERNAL MEDICINE

## 2020-03-09 RX ORDER — VERAPAMIL HYDROCHLORIDE 240 MG/1
240 TABLET, FILM COATED, EXTENDED RELEASE ORAL
Qty: 90 TABLET | Refills: 1 | Status: SHIPPED | OUTPATIENT
Start: 2020-03-09 | End: 2020-06-16 | Stop reason: SDUPTHER

## 2020-03-09 RX ORDER — OLMESARTAN MEDOXOMIL AND HYDROCHLOROTHIAZIDE 20/12.5 20; 12.5 MG/1; MG/1
1 TABLET ORAL DAILY
Qty: 90 TABLET | Refills: 1 | Status: SHIPPED | OUTPATIENT
Start: 2020-03-09 | End: 2020-04-08 | Stop reason: SDUPTHER

## 2020-03-09 NOTE — PROGRESS NOTES
BMI Counseling: Body mass index is 34 46 kg/m²  The BMI is above normal  Nutrition recommendations include decreasing portion sizes and limiting drinks that contain sugar  Exercise recommendations include exercising 3-5 times per week  No pharmacotherapy was ordered  Patient referred to PCP due to patient being overweight  Assessment/Plan:         Diagnoses and all orders for this visit:    Essential hypertension  -     olmesartan-hydrochlorothiazide (BENICAR HCT) 20-12 5 MG per tablet; Take 1 tablet by mouth daily  -     verapamil (CALAN-SR) 240 mg CR tablet; Take 1 tablet (240 mg total) by mouth daily at bedtime          Subjective:      Patient ID: Anisha Cassidy is a 59 y o  male  Denies cp/sob/h/a  Needs rx      The following portions of the patient's history were reviewed and updated as appropriate: He  has a past medical history of Sutherland's esophagus, Elevated liver function tests, GERD (gastroesophageal reflux disease), Helicobacter pylori (H  pylori) infection, Hypertension, and Lyme disease  He   Patient Active Problem List    Diagnosis Date Noted    Snoring 10/24/2018    Other insomnia 10/24/2018    Headache upon awakening 10/24/2018    Hypersomnia 10/24/2018    Obesity (BMI 30-39 9) 10/24/2018    Low testosterone in male 10/24/2018    Shift work sleep disorder 10/24/2018    Fatty liver 10/10/2018    GERD without esophagitis 09/14/2016    Elevated liver function tests 03/09/2016    Essential hypertension 04/29/2015     He  has a past surgical history that includes Hernia repair; Bony pelvis surgery; and pr esophagogastroduodenoscopy transoral diagnostic (N/A, 10/18/2017)  His family history includes Hypotension in his mother; Intracerebral hemorrhage in his father; Stroke in his father  He  reports that he has never smoked  He has never used smokeless tobacco  He reports that he drank alcohol  He reports that he does not use drugs    Current Outpatient Medications   Medication Sig Dispense Refill    Ascorbic Acid (VITAMIN C) 100 MG tablet Take 100 mg by mouth daily      Cholecalciferol (CVS D3) 5000 units capsule Take 1 capsule by mouth daily      Glucosamine-Chondroitin 0790-2132 MG/30ML LIQD Take by mouth      Magnesium Gluconate (MAGNESIUM 27 PO) Take by mouth      multivitamin (THERAGRAN) TABS Take 1 tablet by mouth daily      Nutritional Supplements (CREATINE) 750 MG CAPS Take by mouth      sildenafil (REVATIO) 20 mg tablet TAKE 2-5 TABLETS BY MOUTH AS NEEDED FOR SEXUAL ACTIVITY 50 tablet 3    verapamil (CALAN-SR) 240 mg CR tablet Take 1 tablet (240 mg total) by mouth daily at bedtime 90 tablet 1    vitamin E, tocopherol, 400 units capsule Take by mouth      olmesartan-hydrochlorothiazide (BENICAR HCT) 20-12 5 MG per tablet Take 1 tablet by mouth daily 90 tablet 1    pantoprazole (PROTONIX) 40 mg tablet TAKE 1 TABLET BY MOUTH EVERY DAY (Patient not taking: Reported on 3/9/2020) 90 tablet 0     No current facility-administered medications for this visit  Current Outpatient Medications on File Prior to Visit   Medication Sig    Ascorbic Acid (VITAMIN C) 100 MG tablet Take 100 mg by mouth daily    Cholecalciferol (CVS D3) 5000 units capsule Take 1 capsule by mouth daily    Glucosamine-Chondroitin 7876-8598 MG/30ML LIQD Take by mouth    Magnesium Gluconate (MAGNESIUM 27 PO) Take by mouth    multivitamin (THERAGRAN) TABS Take 1 tablet by mouth daily    Nutritional Supplements (CREATINE) 750 MG CAPS Take by mouth    sildenafil (REVATIO) 20 mg tablet TAKE 2-5 TABLETS BY MOUTH AS NEEDED FOR SEXUAL ACTIVITY    vitamin E, tocopherol, 400 units capsule Take by mouth    pantoprazole (PROTONIX) 40 mg tablet TAKE 1 TABLET BY MOUTH EVERY DAY (Patient not taking: Reported on 3/9/2020)     No current facility-administered medications on file prior to visit  He has No Known Allergies       Review of Systems   Constitutional: Negative  HENT: Negative      Respiratory: Negative  Cardiovascular: Negative  Objective:      /82 (BP Location: Left arm, Patient Position: Sitting, Cuff Size: Large)   Pulse 58   Temp 98 3 °F (36 8 °C) (Temporal)   Resp 16   Ht 5' 7" (1 702 m)   Wt 99 8 kg (220 lb)   SpO2 98%   BMI 34 46 kg/m²          Physical Exam   Constitutional: He appears well-developed and well-nourished  No distress  HENT:   Head: Normocephalic and atraumatic  Right Ear: External ear normal    Left Ear: External ear normal    Nose: Nose normal    Mouth/Throat: Oropharynx is clear and moist  No oropharyngeal exudate  Neck: Normal range of motion  Neck supple  No thyromegaly present  Cardiovascular: Normal rate, regular rhythm, normal heart sounds and intact distal pulses  Exam reveals no gallop and no friction rub  No murmur heard  Pulmonary/Chest: Effort normal and breath sounds normal  No respiratory distress  He has no wheezes  He has no rales  Abdominal: Soft  Bowel sounds are normal  He exhibits no distension and no mass  There is no tenderness  There is no guarding  Lymphadenopathy:     He has no cervical adenopathy  Skin: He is not diaphoretic

## 2020-04-08 ENCOUNTER — OFFICE VISIT (OUTPATIENT)
Dept: FAMILY MEDICINE CLINIC | Facility: CLINIC | Age: 65
End: 2020-04-08
Payer: COMMERCIAL

## 2020-04-08 VITALS
TEMPERATURE: 97.6 F | DIASTOLIC BLOOD PRESSURE: 70 MMHG | SYSTOLIC BLOOD PRESSURE: 118 MMHG | OXYGEN SATURATION: 97 % | WEIGHT: 218.8 LBS | BODY MASS INDEX: 34.34 KG/M2 | HEART RATE: 78 BPM | HEIGHT: 67 IN

## 2020-04-08 DIAGNOSIS — I10 ESSENTIAL HYPERTENSION: Primary | ICD-10-CM

## 2020-04-08 DIAGNOSIS — K21.9 GERD WITHOUT ESOPHAGITIS: ICD-10-CM

## 2020-04-08 DIAGNOSIS — E66.9 CLASS 1 OBESITY WITH BODY MASS INDEX (BMI) OF 34.0 TO 34.9 IN ADULT, UNSPECIFIED OBESITY TYPE, UNSPECIFIED WHETHER SERIOUS COMORBIDITY PRESENT: ICD-10-CM

## 2020-04-08 PROCEDURE — 3008F BODY MASS INDEX DOCD: CPT | Performed by: INTERNAL MEDICINE

## 2020-04-08 PROCEDURE — 99213 OFFICE O/P EST LOW 20 MIN: CPT | Performed by: INTERNAL MEDICINE

## 2020-04-08 PROCEDURE — 1036F TOBACCO NON-USER: CPT | Performed by: INTERNAL MEDICINE

## 2020-04-08 PROCEDURE — 3074F SYST BP LT 130 MM HG: CPT | Performed by: INTERNAL MEDICINE

## 2020-04-08 PROCEDURE — 3078F DIAST BP <80 MM HG: CPT | Performed by: INTERNAL MEDICINE

## 2020-04-08 RX ORDER — OLMESARTAN MEDOXOMIL AND HYDROCHLOROTHIAZIDE 20/12.5 20; 12.5 MG/1; MG/1
1 TABLET ORAL DAILY
Qty: 90 TABLET | Refills: 1 | Status: SHIPPED | OUTPATIENT
Start: 2020-04-08 | End: 2020-05-04

## 2020-05-03 DIAGNOSIS — I10 ESSENTIAL HYPERTENSION: ICD-10-CM

## 2020-05-04 RX ORDER — OLMESARTAN MEDOXOMIL AND HYDROCHLOROTHIAZIDE 20/12.5 20; 12.5 MG/1; MG/1
TABLET ORAL
Qty: 90 TABLET | Refills: 1 | Status: SHIPPED | OUTPATIENT
Start: 2020-05-04 | End: 2020-06-16 | Stop reason: SDUPTHER

## 2020-06-15 ENCOUNTER — TELEPHONE (OUTPATIENT)
Dept: FAMILY MEDICINE CLINIC | Facility: CLINIC | Age: 65
End: 2020-06-15

## 2020-06-16 ENCOUNTER — OFFICE VISIT (OUTPATIENT)
Dept: FAMILY MEDICINE CLINIC | Facility: CLINIC | Age: 65
End: 2020-06-16
Payer: COMMERCIAL

## 2020-06-16 VITALS
TEMPERATURE: 97.9 F | HEIGHT: 67 IN | WEIGHT: 221.2 LBS | BODY MASS INDEX: 34.72 KG/M2 | HEART RATE: 76 BPM | SYSTOLIC BLOOD PRESSURE: 124 MMHG | DIASTOLIC BLOOD PRESSURE: 78 MMHG | OXYGEN SATURATION: 97 %

## 2020-06-16 DIAGNOSIS — K21.9 GERD WITHOUT ESOPHAGITIS: ICD-10-CM

## 2020-06-16 DIAGNOSIS — K22.719 BARRETT'S ESOPHAGUS WITH DYSPLASIA: ICD-10-CM

## 2020-06-16 DIAGNOSIS — I10 ESSENTIAL HYPERTENSION: Primary | ICD-10-CM

## 2020-06-16 PROCEDURE — 1036F TOBACCO NON-USER: CPT | Performed by: INTERNAL MEDICINE

## 2020-06-16 PROCEDURE — 3078F DIAST BP <80 MM HG: CPT | Performed by: INTERNAL MEDICINE

## 2020-06-16 PROCEDURE — 3008F BODY MASS INDEX DOCD: CPT | Performed by: INTERNAL MEDICINE

## 2020-06-16 PROCEDURE — 99213 OFFICE O/P EST LOW 20 MIN: CPT | Performed by: INTERNAL MEDICINE

## 2020-06-16 PROCEDURE — 3074F SYST BP LT 130 MM HG: CPT | Performed by: INTERNAL MEDICINE

## 2020-06-16 RX ORDER — OLMESARTAN MEDOXOMIL AND HYDROCHLOROTHIAZIDE 20/12.5 20; 12.5 MG/1; MG/1
1 TABLET ORAL DAILY
Qty: 90 TABLET | Refills: 1 | Status: SHIPPED | OUTPATIENT
Start: 2020-06-16 | End: 2020-08-18 | Stop reason: SDUPTHER

## 2020-06-16 RX ORDER — VERAPAMIL HYDROCHLORIDE 240 MG/1
240 TABLET, FILM COATED, EXTENDED RELEASE ORAL
Qty: 90 TABLET | Refills: 1 | Status: SHIPPED | OUTPATIENT
Start: 2020-06-16 | End: 2020-10-13 | Stop reason: SDUPTHER

## 2020-06-19 PROBLEM — K22.70 BARRETT'S ESOPHAGUS: Status: ACTIVE | Noted: 2020-06-19

## 2020-06-22 ENCOUNTER — TELEPHONE (OUTPATIENT)
Dept: FAMILY MEDICINE CLINIC | Facility: CLINIC | Age: 65
End: 2020-06-22

## 2020-06-22 DIAGNOSIS — I10 ESSENTIAL HYPERTENSION: ICD-10-CM

## 2020-06-22 DIAGNOSIS — Z02.83 ENCOUNTER FOR EMPLOYMENT-RELATED DRUG TESTING: Primary | ICD-10-CM

## 2020-06-22 RX ORDER — VERAPAMIL HYDROCHLORIDE 240 MG/1
CAPSULE, EXTENDED RELEASE ORAL
Qty: 90 CAPSULE | Refills: 1 | Status: SHIPPED | OUTPATIENT
Start: 2020-06-22 | End: 2020-08-24

## 2020-06-24 ENCOUNTER — APPOINTMENT (OUTPATIENT)
Dept: LAB | Facility: MEDICAL CENTER | Age: 65
End: 2020-06-24
Payer: COMMERCIAL

## 2020-06-24 PROCEDURE — 80307 DRUG TEST PRSMV CHEM ANLYZR: CPT | Performed by: INTERNAL MEDICINE

## 2020-06-25 LAB
AMPHETAMINES UR QL SCN: NEGATIVE NG/ML
BARBITURATES UR QL SCN: NEGATIVE NG/ML
BENZODIAZ UR QL: NEGATIVE NG/ML
BZE UR QL: NEGATIVE NG/ML
CANNABINOIDS UR QL SCN: NEGATIVE NG/ML
METHADONE UR QL SCN: NEGATIVE NG/ML
OPIATES UR QL: NEGATIVE NG/ML
PCP UR QL: NEGATIVE NG/ML
PROPOXYPH UR QL SCN: NEGATIVE NG/ML

## 2020-08-18 DIAGNOSIS — I10 ESSENTIAL HYPERTENSION: ICD-10-CM

## 2020-08-18 RX ORDER — OLMESARTAN MEDOXOMIL AND HYDROCHLOROTHIAZIDE 20/12.5 20; 12.5 MG/1; MG/1
1 TABLET ORAL DAILY
Qty: 90 TABLET | Refills: 1 | Status: SHIPPED | OUTPATIENT
Start: 2020-08-18 | End: 2020-10-13 | Stop reason: SDUPTHER

## 2020-08-24 ENCOUNTER — OFFICE VISIT (OUTPATIENT)
Dept: FAMILY MEDICINE CLINIC | Facility: CLINIC | Age: 65
End: 2020-08-24
Payer: COMMERCIAL

## 2020-08-24 VITALS
RESPIRATION RATE: 16 BRPM | TEMPERATURE: 97.9 F | OXYGEN SATURATION: 98 % | BODY MASS INDEX: 35.31 KG/M2 | SYSTOLIC BLOOD PRESSURE: 134 MMHG | WEIGHT: 225 LBS | DIASTOLIC BLOOD PRESSURE: 90 MMHG | HEART RATE: 65 BPM | HEIGHT: 67 IN

## 2020-08-24 DIAGNOSIS — M54.50 ACUTE LEFT-SIDED LOW BACK PAIN WITHOUT SCIATICA: Primary | ICD-10-CM

## 2020-08-24 PROCEDURE — 99214 OFFICE O/P EST MOD 30 MIN: CPT | Performed by: INTERNAL MEDICINE

## 2020-08-24 PROCEDURE — 1036F TOBACCO NON-USER: CPT | Performed by: INTERNAL MEDICINE

## 2020-08-24 PROCEDURE — 3080F DIAST BP >= 90 MM HG: CPT | Performed by: INTERNAL MEDICINE

## 2020-08-24 PROCEDURE — 3008F BODY MASS INDEX DOCD: CPT | Performed by: INTERNAL MEDICINE

## 2020-08-24 PROCEDURE — 3725F SCREEN DEPRESSION PERFORMED: CPT | Performed by: INTERNAL MEDICINE

## 2020-08-24 PROCEDURE — 3075F SYST BP GE 130 - 139MM HG: CPT | Performed by: INTERNAL MEDICINE

## 2020-08-24 RX ORDER — METHYLPREDNISOLONE 4 MG/1
TABLET ORAL
Qty: 21 EACH | Refills: 0 | Status: SHIPPED | OUTPATIENT
Start: 2020-08-24 | End: 2020-10-13

## 2020-08-24 RX ORDER — CYCLOBENZAPRINE HCL 10 MG
10 TABLET ORAL 3 TIMES DAILY PRN
Qty: 15 TABLET | Refills: 1 | Status: SHIPPED | OUTPATIENT
Start: 2020-08-24 | End: 2020-10-13

## 2020-08-24 NOTE — LETTER
August 24, 2020     Patient: Gina Bennett   YOB: 1955   Date of Visit: 8/24/2020       To Whom it May Concern:    Gina Bennett is under my professional care  He was seen in my office on 8/24/2020  He may return to work on 9/4/20  If you have any questions or concerns, please don't hesitate to call           Sincerely,          Reyna Fregoso DO        CC: No Recipients

## 2020-08-24 NOTE — PROGRESS NOTES
Assessment/Plan:         Diagnoses and all orders for this visit:    Acute left-sided low back pain without sciatica  Comments:  he will use 2 aleve bid x 7-10 days and daily ppi  Orders:  -     cyclobenzaprine (FLEXERIL) 10 mg tablet; Take 1 tablet (10 mg total) by mouth 3 (three) times a day as needed for muscle spasms  -     methylPREDNISolone 4 MG tablet therapy pack; Use as directed on package  -     XR spine lumbar 2 or 3 views injury; Future          Subjective:      Patient ID: Denita Gonzalez is a 59 y o  male  Low back pain as of Friday 1 week ago  Mostly on left  Using motrin  The following portions of the patient's history were reviewed and updated as appropriate: He  has a past medical history of Sutherland's esophagus, Elevated liver function tests, GERD (gastroesophageal reflux disease), Helicobacter pylori (H  pylori) infection, Hypertension, and Lyme disease  He   Patient Active Problem List    Diagnosis Date Noted    Sutherland's esophagus 06/19/2020    Snoring 10/24/2018    Other insomnia 10/24/2018    Headache upon awakening 10/24/2018    Hypersomnia 10/24/2018    Obesity (BMI 30-39 9) 10/24/2018    Low testosterone in male 10/24/2018    Shift work sleep disorder 10/24/2018    Fatty liver 10/10/2018    GERD without esophagitis 09/14/2016    Elevated liver function tests 03/09/2016    Essential hypertension 04/29/2015     He  has a past surgical history that includes Hernia repair; Bony pelvis surgery; and pr esophagogastroduodenoscopy transoral diagnostic (N/A, 10/18/2017)  His family history includes Hypotension in his mother; Intracerebral hemorrhage in his father; Stroke in his father  He  reports that he has never smoked  He has never used smokeless tobacco  He reports previous alcohol use  He reports that he does not use drugs    Current Outpatient Medications   Medication Sig Dispense Refill    Ascorbic Acid (VITAMIN C) 100 MG tablet Take 100 mg by mouth daily      Cholecalciferol ([facility] D3) 5000 units capsule Take 1 capsule by mouth daily      Glucosamine-Chondroitin 9753-9439 MG/30ML LIQD Take by mouth      Magnesium Gluconate (MAGNESIUM 27 PO) Take by mouth      multivitamin (THERAGRAN) TABS Take 1 tablet by mouth daily      Nutritional Supplements (CREATINE) 750 MG CAPS Take by mouth      olmesartan-hydrochlorothiazide (BENICAR HCT) 20-12 5 MG per tablet Take 1 tablet by mouth daily 90 tablet 1    pantoprazole (PROTONIX) 40 mg tablet TAKE 1 TABLET BY MOUTH EVERY DAY 90 tablet 0    sildenafil (REVATIO) 20 mg tablet TAKE 2-5 TABLETS BY MOUTH AS NEEDED FOR SEXUAL ACTIVITY 50 tablet 3    verapamil (CALAN-SR) 240 mg CR tablet Take 1 tablet (240 mg total) by mouth daily at bedtime 90 tablet 1    vitamin E, tocopherol, 400 units capsule Take by mouth      cyclobenzaprine (FLEXERIL) 10 mg tablet Take 1 tablet (10 mg total) by mouth 3 (three) times a day as needed for muscle spasms 15 tablet 1    methylPREDNISolone 4 MG tablet therapy pack Use as directed on package 21 each 0     No current facility-administered medications for this visit        Current Outpatient Medications on File Prior to Visit   Medication Sig    Ascorbic Acid (VITAMIN C) 100 MG tablet Take 100 mg by mouth daily    Cholecalciferol (CVS D3) 5000 units capsule Take 1 capsule by mouth daily    Glucosamine-Chondroitin 4549-8106 MG/30ML LIQD Take by mouth    Magnesium Gluconate (MAGNESIUM 27 PO) Take by mouth    multivitamin (THERAGRAN) TABS Take 1 tablet by mouth daily    Nutritional Supplements (CREATINE) 750 MG CAPS Take by mouth    olmesartan-hydrochlorothiazide (BENICAR HCT) 20-12 5 MG per tablet Take 1 tablet by mouth daily    pantoprazole (PROTONIX) 40 mg tablet TAKE 1 TABLET BY MOUTH EVERY DAY    sildenafil (REVATIO) 20 mg tablet TAKE 2-5 TABLETS BY MOUTH AS NEEDED FOR SEXUAL ACTIVITY    verapamil (CALAN-SR) 240 mg CR tablet Take 1 tablet (240 mg total) by mouth daily at bedtime    vitamin E, tocopherol, 400 units capsule Take by mouth     No current facility-administered medications on file prior to visit  He has No Known Allergies       Review of Systems   Constitutional: Negative  HENT: Negative  Respiratory: Negative  Cardiovascular: Negative  Musculoskeletal: Positive for back pain  Objective:      /90 (BP Location: Left arm, Patient Position: Sitting, Cuff Size: Large)   Pulse 65   Temp 97 9 °F (36 6 °C) (Temporal)   Resp 16   Ht 5' 7" (1 702 m)   Wt 102 kg (225 lb)   SpO2 98%   BMI 35 24 kg/m²          Physical Exam  Constitutional:       Appearance: Normal appearance  He is obese  HENT:      Head: Normocephalic and atraumatic  Right Ear: Tympanic membrane and ear canal normal       Left Ear: Tympanic membrane and ear canal normal    Neck:      Musculoskeletal: Normal range of motion and neck supple  Cardiovascular:      Rate and Rhythm: Normal rate and regular rhythm  Pulmonary:      Effort: Pulmonary effort is normal       Breath sounds: Normal breath sounds  Musculoskeletal:      Comments: str 5/5   Neurological:      Mental Status: He is alert

## 2020-08-26 ENCOUNTER — APPOINTMENT (OUTPATIENT)
Dept: RADIOLOGY | Facility: MEDICAL CENTER | Age: 65
End: 2020-08-26
Payer: COMMERCIAL

## 2020-08-26 PROCEDURE — 72100 X-RAY EXAM L-S SPINE 2/3 VWS: CPT

## 2020-08-28 ENCOUNTER — TELEPHONE (OUTPATIENT)
Dept: FAMILY MEDICINE CLINIC | Facility: CLINIC | Age: 65
End: 2020-08-28

## 2020-08-28 NOTE — TELEPHONE ENCOUNTER
Patient called- Christal Villagomez he was under your care for some back issues  There has been a family emergency which is all taking place in [place]  He's asking if he's able to fly there and if there are any restrictions   Please advise        [phone]

## 2020-09-02 ENCOUNTER — TELEPHONE (OUTPATIENT)
Dept: FAMILY MEDICINE CLINIC | Facility: CLINIC | Age: 65
End: 2020-09-02

## 2020-09-08 NOTE — TELEPHONE ENCOUNTER
Patient called in- He has returned to [place] and dropped off forms with us to be filled out for his time out of work  Please advise   On Dr Marybel judd

## 2020-09-10 ENCOUNTER — TELEMEDICINE (OUTPATIENT)
Dept: FAMILY MEDICINE CLINIC | Facility: CLINIC | Age: 65
End: 2020-09-10
Payer: COMMERCIAL

## 2020-09-10 DIAGNOSIS — M54.89 BACK PAIN WITHOUT SCIATICA: ICD-10-CM

## 2020-09-10 DIAGNOSIS — Z20.828 EXPOSURE TO SARS-ASSOCIATED CORONAVIRUS: Primary | ICD-10-CM

## 2020-09-10 DIAGNOSIS — J06.9 UPPER RESPIRATORY TRACT INFECTION, UNSPECIFIED TYPE: ICD-10-CM

## 2020-09-10 PROCEDURE — 99214 OFFICE O/P EST MOD 30 MIN: CPT | Performed by: INTERNAL MEDICINE

## 2020-09-10 RX ORDER — AZITHROMYCIN 250 MG/1
TABLET, FILM COATED ORAL
Qty: 5 TABLET | Refills: 0 | Status: SHIPPED | OUTPATIENT
Start: 2020-09-10 | End: 2020-09-15

## 2020-09-10 NOTE — PROGRESS NOTES
COVID-19 Virtual Visit     Assessment/Plan:    Problem List Items Addressed This Visit     None      Visit Diagnoses     Exposure to SARS-associated coronavirus    -  Primary    he was told to quarrentine x 2 weeks but is feeling ill+cough +diarrhea  +h/a    Relevant Orders    Novel Coronavirus (COVID-19), PCR [facility] - Collected at [facility] or [facility]    Back pain without sciatica        Upper respiratory tract infection, unspecified type        Relevant Medications    azithromycin (ZITHROMAX) 250 mg tablet        This virtual check-in was done via telephone  Disposition:      I referred John Given to one of our centralized sites for a COVID-19 swab    I spent 17 minutes directly with the patient during this visit    Encounter provider Kristin Kam DO    Provider located at [address] [place] [id number] [phone]    Recent Visits  Date Type Provider Dept   09/10/20 Telemedicine Kristin Kam DO  Lea Gant   Showing recent visits within past 7 days and meeting all other requirements     Future Appointments  No visits were found meeting these conditions  Showing future appointments within next 150 days and meeting all other requirements        Patient agrees to participate in a virtual check in via telephone or video visit instead of presenting to the office to address urgent/immediate medical needs  Patient is aware this is a billable service  After connecting through telephone, the patient was identified by name and date of birth  Vince Lowry was informed that this was a telemedicine visit and that the exam was being conducted confidentially over secure lines  My office door was closed  No one else was in the room  Vince Lowry acknowledged consent and understanding of privacy and security of the telemedicine visit    I informed the patient that I have reviewed his record in Epic and presented the opportunity for him to ask any questions regarding the visit today  The patient agreed to participate  Subjective  Boston Batista is a 59 y o  male who is concerned about COVID-19  He reports cough, headache and diarrhea  He has not experienced fever, chills, repeated shaking with chills, shortness of breath, anosmia, abdominal pain, nausea and vomiting He has had contact with a person who is under investigation for or who is positive for COVID-19 within the last 14 days  He has not been hospitalized recently for fever and/or lower respiratory symptoms  Past Medical History:   Diagnosis Date    Sutherland's esophagus     Elevated liver function tests     GERD (gastroesophageal reflux disease)     Helicobacter pylori (H  pylori) infection     last assessed - 27Oct2016    Hypertension     Lyme disease     last assessed - 25Jul2012       Past Surgical History:   Procedure Laterality Date    BONY PELVIS SURGERY      HERNIA REPAIR      IN ESOPHAGOGASTRODUODENOSCOPY TRANSORAL DIAGNOSTIC N/A 10/18/2017    Procedure: ESOPHAGOGASTRODUODENOSCOPY (EGD); Surgeon: Khalif Crockett MD;  Location: [facility];   Service: Gastroenterology       Current Outpatient Medications   Medication Sig Dispense Refill    Ascorbic Acid (VITAMIN C) 100 MG tablet Take 100 mg by mouth daily      azithromycin (ZITHROMAX) 250 mg tablet 2 tabs today then one tab daily days 2 thru 5 5 tablet 0    Cholecalciferol (CVS D3) 5000 units capsule Take 1 capsule by mouth daily      cyclobenzaprine (FLEXERIL) 10 mg tablet Take 1 tablet (10 mg total) by mouth 3 (three) times a day as needed for muscle spasms 15 tablet 1    Glucosamine-Chondroitin 6844-5820 MG/30ML LIQD Take by mouth      Magnesium Gluconate (MAGNESIUM 27 PO) Take by mouth      methylPREDNISolone 4 MG tablet therapy pack Use as directed on package 21 each 0    multivitamin (THERAGRAN) TABS Take 1 tablet by mouth daily      Nutritional Supplements (CREATINE) 750 MG CAPS Take by mouth      olmesartan-hydrochlorothiazide (BENICAR HCT) 20-12 5 MG per tablet Take 1 tablet by mouth daily 90 tablet 1    pantoprazole (PROTONIX) 40 mg tablet TAKE 1 TABLET BY MOUTH EVERY DAY 90 tablet 0    sildenafil (REVATIO) 20 mg tablet TAKE 2-5 TABLETS BY MOUTH AS NEEDED FOR SEXUAL ACTIVITY 50 tablet 3    verapamil (CALAN-SR) 240 mg CR tablet Take 1 tablet (240 mg total) by mouth daily at bedtime 90 tablet 1    vitamin E, tocopherol, 400 units capsule Take by mouth       No current facility-administered medications for this visit  No Known Allergies    Review of Systems    Video Exam    There were no vitals filed for this visit  Kelli Larios appears no distress  Physical Exam     VIRTUAL VISIT DISCLAIMER    Valeria Fierro acknowledges that he has consented to an online visit or consultation  He understands that the online visit is based solely on information provided by him, and that, in the absence of a face-to-face physical evaluation by the physician, the diagnosis he receives is both limited and provisional in terms of accuracy and completeness  This is not intended to replace a full medical face-to-face evaluation by the physician  Valeria Fierro understands and accepts these terms

## 2020-09-11 DIAGNOSIS — Z20.828 EXPOSURE TO SARS-ASSOCIATED CORONAVIRUS: ICD-10-CM

## 2020-09-11 PROCEDURE — U0003 INFECTIOUS AGENT DETECTION BY NUCLEIC ACID (DNA OR RNA); SEVERE ACUTE RESPIRATORY SYNDROME CORONAVIRUS 2 (SARS-COV-2) (CORONAVIRUS DISEASE [COVID-19]), AMPLIFIED PROBE TECHNIQUE, MAKING USE OF HIGH THROUGHPUT TECHNOLOGIES AS DESCRIBED BY CMS-2020-01-R: HCPCS | Performed by: INTERNAL MEDICINE

## 2020-09-13 LAB — SARS-COV-2 RNA SPEC QL NAA+PROBE: NOT DETECTED

## 2020-09-15 ENCOUNTER — TELEPHONE (OUTPATIENT)
Dept: FAMILY MEDICINE CLINIC | Facility: CLINIC | Age: 65
End: 2020-09-15

## 2020-09-15 DIAGNOSIS — R53.83 FATIGUE, UNSPECIFIED TYPE: Primary | ICD-10-CM

## 2020-09-23 ENCOUNTER — APPOINTMENT (OUTPATIENT)
Dept: LAB | Facility: MEDICAL CENTER | Age: 65
End: 2020-09-23
Payer: COMMERCIAL

## 2020-09-23 DIAGNOSIS — R53.83 FATIGUE, UNSPECIFIED TYPE: ICD-10-CM

## 2020-09-23 DIAGNOSIS — E66.9 CLASS 1 OBESITY WITH BODY MASS INDEX (BMI) OF 34.0 TO 34.9 IN ADULT, UNSPECIFIED OBESITY TYPE, UNSPECIFIED WHETHER SERIOUS COMORBIDITY PRESENT: ICD-10-CM

## 2020-09-23 DIAGNOSIS — K21.9 GERD WITHOUT ESOPHAGITIS: ICD-10-CM

## 2020-09-23 LAB
ALBUMIN SERPL BCP-MCNC: 4.1 G/DL (ref 3.5–5)
ALP SERPL-CCNC: 82 U/L (ref 46–116)
ALT SERPL W P-5'-P-CCNC: 91 U/L (ref 12–78)
ANION GAP SERPL CALCULATED.3IONS-SCNC: 8 MMOL/L (ref 4–13)
AST SERPL W P-5'-P-CCNC: 38 U/L (ref 5–45)
BILIRUB SERPL-MCNC: 0.6 MG/DL (ref 0.2–1)
BUN SERPL-MCNC: 13 MG/DL (ref 5–25)
CALCIUM SERPL-MCNC: 9.3 MG/DL (ref 8.3–10.1)
CHLORIDE SERPL-SCNC: 108 MMOL/L (ref 100–108)
CHOLEST SERPL-MCNC: 141 MG/DL (ref 50–200)
CO2 SERPL-SCNC: 26 MMOL/L (ref 21–32)
CREAT SERPL-MCNC: 0.86 MG/DL (ref 0.6–1.3)
ERYTHROCYTE [DISTWIDTH] IN BLOOD BY AUTOMATED COUNT: 14.4 % (ref 11.6–15.1)
GFR SERPL CREATININE-BSD FRML MDRD: 92 ML/MIN/1.73SQ M
GLUCOSE P FAST SERPL-MCNC: 82 MG/DL (ref 65–99)
HCT VFR BLD AUTO: 45.5 % (ref 36.5–49.3)
HDLC SERPL-MCNC: 37 MG/DL
HGB BLD-MCNC: 14.7 G/DL (ref 12–17)
LDLC SERPL CALC-MCNC: 81 MG/DL (ref 0–100)
MCH RBC QN AUTO: 29.3 PG (ref 26.8–34.3)
MCHC RBC AUTO-ENTMCNC: 32.3 G/DL (ref 31.4–37.4)
MCV RBC AUTO: 91 FL (ref 82–98)
NONHDLC SERPL-MCNC: 104 MG/DL
PLATELET # BLD AUTO: 323 THOUSANDS/UL (ref 149–390)
PMV BLD AUTO: 10.5 FL (ref 8.9–12.7)
POTASSIUM SERPL-SCNC: 4.1 MMOL/L (ref 3.5–5.3)
PROT SERPL-MCNC: 7.8 G/DL (ref 6.4–8.2)
RBC # BLD AUTO: 5.02 MILLION/UL (ref 3.88–5.62)
SODIUM SERPL-SCNC: 142 MMOL/L (ref 136–145)
TRIGL SERPL-MCNC: 117 MG/DL
TSH SERPL DL<=0.05 MIU/L-ACNC: 1.23 UIU/ML (ref 0.36–3.74)
WBC # BLD AUTO: 6.47 THOUSAND/UL (ref 4.31–10.16)

## 2020-09-23 PROCEDURE — 80053 COMPREHEN METABOLIC PANEL: CPT

## 2020-09-23 PROCEDURE — 80061 LIPID PANEL: CPT

## 2020-09-23 PROCEDURE — 85027 COMPLETE CBC AUTOMATED: CPT

## 2020-09-23 PROCEDURE — 36415 COLL VENOUS BLD VENIPUNCTURE: CPT

## 2020-09-23 PROCEDURE — 84443 ASSAY THYROID STIM HORMONE: CPT

## 2020-10-13 ENCOUNTER — OFFICE VISIT (OUTPATIENT)
Dept: FAMILY MEDICINE CLINIC | Facility: CLINIC | Age: 65
End: 2020-10-13
Payer: COMMERCIAL

## 2020-10-13 VITALS
DIASTOLIC BLOOD PRESSURE: 80 MMHG | RESPIRATION RATE: 16 BRPM | HEIGHT: 67 IN | BODY MASS INDEX: 35 KG/M2 | HEART RATE: 68 BPM | TEMPERATURE: 96.7 F | SYSTOLIC BLOOD PRESSURE: 122 MMHG | OXYGEN SATURATION: 99 % | WEIGHT: 223 LBS

## 2020-10-13 DIAGNOSIS — I10 ESSENTIAL HYPERTENSION: Primary | ICD-10-CM

## 2020-10-13 DIAGNOSIS — K22.719 BARRETT'S ESOPHAGUS WITH DYSPLASIA: ICD-10-CM

## 2020-10-13 DIAGNOSIS — K76.0 FATTY LIVER: ICD-10-CM

## 2020-10-13 PROCEDURE — 3725F SCREEN DEPRESSION PERFORMED: CPT | Performed by: INTERNAL MEDICINE

## 2020-10-13 PROCEDURE — 99214 OFFICE O/P EST MOD 30 MIN: CPT | Performed by: INTERNAL MEDICINE

## 2020-10-13 PROCEDURE — 3074F SYST BP LT 130 MM HG: CPT | Performed by: INTERNAL MEDICINE

## 2020-10-13 PROCEDURE — 3079F DIAST BP 80-89 MM HG: CPT | Performed by: INTERNAL MEDICINE

## 2020-10-13 PROCEDURE — 1036F TOBACCO NON-USER: CPT | Performed by: INTERNAL MEDICINE

## 2020-10-13 RX ORDER — VERAPAMIL HYDROCHLORIDE 240 MG/1
240 TABLET, FILM COATED, EXTENDED RELEASE ORAL
Qty: 90 TABLET | Refills: 1 | Status: SHIPPED | OUTPATIENT
Start: 2020-10-13 | End: 2021-08-30 | Stop reason: SDUPTHER

## 2020-10-13 RX ORDER — OLMESARTAN MEDOXOMIL AND HYDROCHLOROTHIAZIDE 20/12.5 20; 12.5 MG/1; MG/1
1 TABLET ORAL DAILY
Qty: 90 TABLET | Refills: 1 | Status: SHIPPED | OUTPATIENT
Start: 2020-10-13 | End: 2021-04-13 | Stop reason: SDUPTHER

## 2020-12-14 ENCOUNTER — TELEMEDICINE (OUTPATIENT)
Dept: FAMILY MEDICINE CLINIC | Facility: CLINIC | Age: 65
End: 2020-12-14
Payer: COMMERCIAL

## 2020-12-14 DIAGNOSIS — Z03.818 ENCOUNTER FOR OBSERVATION FOR SUSPECTED EXPOSURE TO OTHER BIOLOGICAL AGENTS RULED OUT: Primary | ICD-10-CM

## 2020-12-14 PROCEDURE — U0003 INFECTIOUS AGENT DETECTION BY NUCLEIC ACID (DNA OR RNA); SEVERE ACUTE RESPIRATORY SYNDROME CORONAVIRUS 2 (SARS-COV-2) (CORONAVIRUS DISEASE [COVID-19]), AMPLIFIED PROBE TECHNIQUE, MAKING USE OF HIGH THROUGHPUT TECHNOLOGIES AS DESCRIBED BY CMS-2020-01-R: HCPCS | Performed by: FAMILY MEDICINE

## 2020-12-14 PROCEDURE — 1036F TOBACCO NON-USER: CPT | Performed by: FAMILY MEDICINE

## 2020-12-14 PROCEDURE — 99214 OFFICE O/P EST MOD 30 MIN: CPT | Performed by: FAMILY MEDICINE

## 2020-12-16 LAB — SARS-COV-2 RNA SPEC QL NAA+PROBE: NOT DETECTED

## 2021-01-11 ENCOUNTER — TELEPHONE (OUTPATIENT)
Dept: UROLOGY | Facility: CLINIC | Age: 66
End: 2021-01-11

## 2021-01-11 NOTE — TELEPHONE ENCOUNTER
Called patient to remind him to get psa prior to appt 1/13/20  Patient is unable to keep appt and needs to reschedule due to work schedule  Patient would like phone call from clerical to work out a new appt date  Patient aware he will need PSA drawn prior to his new appt  Thank you for coordinating

## 2021-01-15 NOTE — TELEPHONE ENCOUNTER
appt r/s'd to February 17th @ 930 AM spoke with patient and he will call us back to confirm - he was driving and couldn't look at his appt   book

## 2021-01-18 ENCOUNTER — LAB (OUTPATIENT)
Dept: LAB | Facility: MEDICAL CENTER | Age: 66
End: 2021-01-18
Payer: COMMERCIAL

## 2021-01-18 DIAGNOSIS — Z12.5 PROSTATE CANCER SCREENING: ICD-10-CM

## 2021-01-18 LAB — PSA SERPL-MCNC: 1.2 NG/ML (ref 0–4)

## 2021-01-18 PROCEDURE — G0103 PSA SCREENING: HCPCS

## 2021-01-18 PROCEDURE — 36415 COLL VENOUS BLD VENIPUNCTURE: CPT

## 2021-02-17 ENCOUNTER — OFFICE VISIT (OUTPATIENT)
Dept: UROLOGY | Facility: CLINIC | Age: 66
End: 2021-02-17
Payer: COMMERCIAL

## 2021-02-17 VITALS
SYSTOLIC BLOOD PRESSURE: 150 MMHG | BODY MASS INDEX: 36.57 KG/M2 | DIASTOLIC BLOOD PRESSURE: 90 MMHG | HEIGHT: 67 IN | HEART RATE: 65 BPM | WEIGHT: 233 LBS

## 2021-02-17 DIAGNOSIS — Z12.5 SCREENING FOR PROSTATE CANCER: Primary | ICD-10-CM

## 2021-02-17 DIAGNOSIS — N52.9 ERECTILE DYSFUNCTION, UNSPECIFIED ERECTILE DYSFUNCTION TYPE: ICD-10-CM

## 2021-02-17 PROCEDURE — 1036F TOBACCO NON-USER: CPT | Performed by: PHYSICIAN ASSISTANT

## 2021-02-17 PROCEDURE — 99213 OFFICE O/P EST LOW 20 MIN: CPT | Performed by: PHYSICIAN ASSISTANT

## 2021-02-17 PROCEDURE — 3080F DIAST BP >= 90 MM HG: CPT | Performed by: PHYSICIAN ASSISTANT

## 2021-02-17 PROCEDURE — 3008F BODY MASS INDEX DOCD: CPT | Performed by: PHYSICIAN ASSISTANT

## 2021-02-17 PROCEDURE — 3077F SYST BP >= 140 MM HG: CPT | Performed by: PHYSICIAN ASSISTANT

## 2021-02-17 PROCEDURE — 1160F RVW MEDS BY RX/DR IN RCRD: CPT | Performed by: PHYSICIAN ASSISTANT

## 2021-02-17 RX ORDER — SILDENAFIL CITRATE 20 MG/1
20 TABLET ORAL DAILY PRN
Qty: 50 TABLET | Refills: 3 | Status: SHIPPED | OUTPATIENT
Start: 2021-02-17 | End: 2021-09-16 | Stop reason: SDUPTHER

## 2021-02-17 NOTE — ASSESSMENT & PLAN NOTE
- patient is currently doing well on sildenafil 20 mg  We will continue this medication at this time for his erectile dysfunction   -  Prescription was refilled today and  Printed prescription sent with patient at checkup

## 2021-02-17 NOTE — ASSESSMENT & PLAN NOTE
- Digital rectal exam  Performed today  35 g prostate with no asymmetry no palpable nodules, no bogginess, appropriate sphincter tone  Patient's most recent PSA is 1 2 and stable  -  Will continue with PSA yearly and digital rectal exam as patient is still within its prostate cancer screening  Age bracket  - follow-up in 1 year PSA and [facility]DE[facility]

## 2021-02-17 NOTE — PROGRESS NOTES
Assessment/Plan:    Screening for prostate cancer  - Digital rectal exam  Performed today  35 g prostate with no asymmetry no palpable nodules, no bogginess, appropriate sphincter tone  Patient's most recent PSA is 1 2 and stable  -  Will continue with PSA yearly and digital rectal exam as patient is still within its prostate cancer screening  Age bracket  - follow-up in 1 year PSA and [facility]DE[facility]  Erectile dysfunction  - patient is currently doing well on sildenafil 20 mg  We will continue this medication at this time for his erectile dysfunction   -  Prescription was refilled today and  Printed prescription sent with patient at checkup  Problem List Items Addressed This Visit        Other    Erectile dysfunction     - patient is currently doing well on sildenafil 20 mg  We will continue this medication at this time for his erectile dysfunction   -  Prescription was refilled today and  Printed prescription sent with patient at checkup  Relevant Medications    sildenafil (REVATIO) 20 mg tablet    Screening for prostate cancer - Primary     - Digital rectal exam  Performed today  35 g prostate with no asymmetry no palpable nodules, no bogginess, appropriate sphincter tone  Patient's most recent PSA is 1 2 and stable  -  Will continue with PSA yearly and digital rectal exam as patient is still within its prostate cancer screening  Age bracket  - follow-up in 1 year PSA and [facility]  Relevant Orders    PSA, Total Screen            Subjective:      Patient ID: Romayne Hamper is a 72 y o  male  HPI    Patient is a 71-year-old male  With a past urologic history of low testosterone and erectile dysfunction  Currently undergoing prostate cancer screening last PSA was 1 2 with a normal prostate examination  Patient had an adverse reaction to his testosterone replacement therapy and therefore was discontinued at that time  He was not interested in continuing with testosterone supplementation  Patient is currently taking sildenafil for erectile dysfunction  Patient's most recent PSA continues to be 1 2 and stable  Patient reports he is currently doing well and has no urologic complaints  He states that he is doing well without any testosterone therapy  Does report fatigue but relates this to his high demanding job  Denies any frequency, urgency, dysuria, flank pain, nausea, vomiting, fevers or chills  The following portions of the patient's history were reviewed and updated as appropriate:   He  has a past medical history of Sutherland's esophagus, Elevated liver function tests, GERD (gastroesophageal reflux disease), Helicobacter pylori (H  pylori) infection, Hypertension, and Lyme disease  He   Patient Active Problem List    Diagnosis Date Noted    Erectile dysfunction 02/17/2021    Screening for prostate cancer 02/17/2021    Sutherland's esophagus 06/19/2020    Snoring 10/24/2018    Other insomnia 10/24/2018    Headache upon awakening 10/24/2018    Hypersomnia 10/24/2018    Obesity (BMI 30-39 9) 10/24/2018    Low testosterone in male 10/24/2018    Shift work sleep disorder 10/24/2018    Fatty liver 10/10/2018    GERD without esophagitis 09/14/2016    Elevated liver function tests 03/09/2016    Essential hypertension 04/29/2015     He  has a past surgical history that includes Hernia repair; Bony pelvis surgery; and pr esophagogastroduodenoscopy transoral diagnostic (N/A, 10/18/2017)  His family history includes Hypotension in his mother; Intracerebral hemorrhage in his father; Stroke in his father  He  reports that he has never smoked  He has never used smokeless tobacco  He reports previous alcohol use  He reports that he does not use drugs    Current Outpatient Medications   Medication Sig Dispense Refill    Ascorbic Acid (VITAMIN C) 100 MG tablet Take 100 mg by mouth daily      Cholecalciferol (CVS D3) 5000 units capsule Take 1 capsule by mouth daily      Glucosamine-Chondroitin 0427-4472 MG/30ML LIQD Take by mouth      Magnesium Gluconate (MAGNESIUM 27 PO) Take by mouth      multivitamin (THERAGRAN) TABS Take 1 tablet by mouth daily      Nutritional Supplements (CREATINE) 750 MG CAPS Take by mouth      olmesartan-hydrochlorothiazide (BENICAR HCT) 20-12 5 MG per tablet Take 1 tablet by mouth daily 90 tablet 1    pantoprazole (PROTONIX) 40 mg tablet TAKE 1 TABLET BY MOUTH EVERY DAY 90 tablet 0    sildenafil (REVATIO) 20 mg tablet Take 1 tablet (20 mg total) by mouth daily as needed (Erectile dysfunction) 50 tablet 3    verapamil (CALAN-SR) 240 mg CR tablet Take 1 tablet (240 mg total) by mouth daily at bedtime 90 tablet 1    vitamin E, tocopherol, 400 units capsule Take by mouth       No current facility-administered medications for this visit  He has No Known Allergies       Review of Systems   Constitutional: Positive for fatigue  Negative for chills and fever  HENT: Negative  Eyes: Negative  Respiratory: Negative  Cardiovascular: Negative  Gastrointestinal: Negative  Endocrine: Negative  Genitourinary: Negative  Negative for difficulty urinating, dysuria, flank pain, frequency, hematuria and urgency  Musculoskeletal: Negative  Skin: Negative  Allergic/Immunologic: Negative  Neurological: Negative  Hematological: Negative  Psychiatric/Behavioral: Negative  Objective:      /90 (BP Location: Right arm, Patient Position: Sitting, Cuff Size: Standard)   Pulse 65   Ht 5' 7" (1 702 m)   Wt 106 kg (233 lb)   BMI 36 49 kg/m²          Physical Exam  Constitutional:       General: He is not in acute distress  Appearance: Normal appearance  He is normal weight  He is not ill-appearing or toxic-appearing  HENT:      Head: Normocephalic and atraumatic  Right Ear: External ear normal       Left Ear: External ear normal       Nose: Nose normal    Eyes:      General: No scleral icterus       Conjunctiva/sclera: Conjunctivae normal    Neck:      Musculoskeletal: Normal range of motion  Cardiovascular:      Rate and Rhythm: Normal rate  Pulses: Normal pulses  Pulmonary:      Effort: Pulmonary effort is normal    Abdominal:      General: Bowel sounds are normal    Genitourinary:     Comments: Prostate was approximately 35 g   Smooth,no palpable nodules, asymmetry, bogginess  Good sphincter tone  Musculoskeletal: Normal range of motion  Neurological:      General: No focal deficit present  Mental Status: He is alert and oriented to person, place, and time  Psychiatric:         Mood and Affect: Mood normal          Behavior: Behavior normal          Thought Content:  Thought content normal          Judgment: Judgment normal          Lesli Torres PA-C

## 2021-04-13 ENCOUNTER — APPOINTMENT (OUTPATIENT)
Dept: RADIOLOGY | Facility: MEDICAL CENTER | Age: 66
End: 2021-04-13
Payer: COMMERCIAL

## 2021-04-13 ENCOUNTER — OFFICE VISIT (OUTPATIENT)
Dept: FAMILY MEDICINE CLINIC | Facility: CLINIC | Age: 66
End: 2021-04-13
Payer: COMMERCIAL

## 2021-04-13 VITALS
OXYGEN SATURATION: 98 % | TEMPERATURE: 98.2 F | HEART RATE: 60 BPM | DIASTOLIC BLOOD PRESSURE: 90 MMHG | RESPIRATION RATE: 16 BRPM | HEIGHT: 67 IN | SYSTOLIC BLOOD PRESSURE: 142 MMHG | WEIGHT: 227 LBS | BODY MASS INDEX: 35.63 KG/M2

## 2021-04-13 DIAGNOSIS — S39.012A STRAIN OF LUMBAR REGION, INITIAL ENCOUNTER: ICD-10-CM

## 2021-04-13 DIAGNOSIS — M25.512 ACUTE PAIN OF LEFT SHOULDER: Primary | ICD-10-CM

## 2021-04-13 DIAGNOSIS — S86.911A KNEE STRAIN, RIGHT, INITIAL ENCOUNTER: ICD-10-CM

## 2021-04-13 DIAGNOSIS — I10 ESSENTIAL HYPERTENSION: ICD-10-CM

## 2021-04-13 DIAGNOSIS — S46.911A SHOULDER STRAIN, RIGHT, INITIAL ENCOUNTER: ICD-10-CM

## 2021-04-13 PROCEDURE — 73030 X-RAY EXAM OF SHOULDER: CPT

## 2021-04-13 PROCEDURE — 3008F BODY MASS INDEX DOCD: CPT | Performed by: INTERNAL MEDICINE

## 2021-04-13 PROCEDURE — 99214 OFFICE O/P EST MOD 30 MIN: CPT | Performed by: INTERNAL MEDICINE

## 2021-04-13 PROCEDURE — 1036F TOBACCO NON-USER: CPT | Performed by: INTERNAL MEDICINE

## 2021-04-13 PROCEDURE — 3080F DIAST BP >= 90 MM HG: CPT | Performed by: INTERNAL MEDICINE

## 2021-04-13 PROCEDURE — 72100 X-RAY EXAM L-S SPINE 2/3 VWS: CPT

## 2021-04-13 PROCEDURE — 73564 X-RAY EXAM KNEE 4 OR MORE: CPT

## 2021-04-13 PROCEDURE — 3725F SCREEN DEPRESSION PERFORMED: CPT | Performed by: INTERNAL MEDICINE

## 2021-04-13 PROCEDURE — 3077F SYST BP >= 140 MM HG: CPT | Performed by: INTERNAL MEDICINE

## 2021-04-13 PROCEDURE — 1160F RVW MEDS BY RX/DR IN RCRD: CPT | Performed by: INTERNAL MEDICINE

## 2021-04-13 RX ORDER — METHYLPREDNISOLONE 4 MG/1
TABLET ORAL
Qty: 21 EACH | Refills: 0 | Status: SHIPPED | OUTPATIENT
Start: 2021-04-13

## 2021-04-13 RX ORDER — OLMESARTAN MEDOXOMIL AND HYDROCHLOROTHIAZIDE 20/12.5 20; 12.5 MG/1; MG/1
1 TABLET ORAL DAILY
Qty: 90 TABLET | Refills: 1 | Status: SHIPPED | OUTPATIENT
Start: 2021-04-13 | End: 2021-11-22

## 2021-04-13 RX ORDER — CYCLOBENZAPRINE HCL 10 MG
10 TABLET ORAL 3 TIMES DAILY PRN
Qty: 15 TABLET | Refills: 1 | Status: SHIPPED | OUTPATIENT
Start: 2021-04-13

## 2021-04-13 RX ORDER — MELOXICAM 15 MG/1
15 TABLET ORAL DAILY
Qty: 30 TABLET | Refills: 1 | Status: SHIPPED | OUTPATIENT
Start: 2021-04-13

## 2021-04-13 NOTE — PROGRESS NOTES
BMI Counseling: Body mass index is 35 55 kg/m²  The BMI is above normal  Nutrition recommendations include decreasing portion sizes and limiting drinks that contain sugar  Exercise recommendations include exercising 3-5 times per week  Patient referred to PCP due to patient being overweight  Assessment/Plan:         Diagnoses and all orders for this visit:    Acute pain of left shoulder  Comments:  nsaid/flex/medrol  Orders:  -     meloxicam (MOBIC) 15 mg tablet; Take 1 tablet (15 mg total) by mouth daily  -     cyclobenzaprine (FLEXERIL) 10 mg tablet; Take 1 tablet (10 mg total) by mouth 3 (three) times a day as needed for muscle spasms  -     methylPREDNISolone 4 MG tablet therapy pack; Use as directed on package  -     XR shoulder 2+ vw left; Future    Essential hypertension  Comments:  renew meds  Orders:  -     olmesartan-hydrochlorothiazide (BENICAR HCT) 20-12 5 MG per tablet; Take 1 tablet by mouth daily    Knee strain, right, initial encounter  Comments:  nsaid/medrol/flexeril  Orders:  -     meloxicam (MOBIC) 15 mg tablet; Take 1 tablet (15 mg total) by mouth daily  -     cyclobenzaprine (FLEXERIL) 10 mg tablet; Take 1 tablet (10 mg total) by mouth 3 (three) times a day as needed for muscle spasms  -     methylPREDNISolone 4 MG tablet therapy pack; Use as directed on package  -     XR knee 4+ vw right injury; Future    Strain of lumbar region, initial encounter  -     meloxicam (MOBIC) 15 mg tablet; Take 1 tablet (15 mg total) by mouth daily  -     cyclobenzaprine (FLEXERIL) 10 mg tablet; Take 1 tablet (10 mg total) by mouth 3 (three) times a day as needed for muscle spasms  -     methylPREDNISolone 4 MG tablet therapy pack; Use as directed on package  -     XR spine lumbar 2 or 3 views injury; Future          Subjective:      Patient ID: Glady Lombard is a 72 y o  male  Pt states was using a stick at work to break a fuse and after pulling hurt his left shoulder and left back upper    Also his left low back hurts  He was swing his leg over to get into his bucket on the truck at work  He pulled his left low back and pulled the back  +occ pain in back of left thigh      The following portions of the patient's history were reviewed and updated as appropriate: He  has a past medical history of Sutherland's esophagus, Elevated liver function tests, GERD (gastroesophageal reflux disease), Helicobacter pylori (H  pylori) infection, Hypertension, and Lyme disease  He   Patient Active Problem List    Diagnosis Date Noted    Erectile dysfunction 02/17/2021    Screening for prostate cancer 02/17/2021    Sutherland's esophagus 06/19/2020    Snoring 10/24/2018    Other insomnia 10/24/2018    Headache upon awakening 10/24/2018    Hypersomnia 10/24/2018    Obesity (BMI 30-39 9) 10/24/2018    Low testosterone in male 10/24/2018    Shift work sleep disorder 10/24/2018    Fatty liver 10/10/2018    GERD without esophagitis 09/14/2016    Elevated liver function tests 03/09/2016    Essential hypertension 04/29/2015     He  has a past surgical history that includes Hernia repair; Bony pelvis surgery; and pr esophagogastroduodenoscopy transoral diagnostic (N/A, 10/18/2017)  His family history includes Hypotension in his mother; Intracerebral hemorrhage in his father; Stroke in his father  He  reports that he has never smoked  He has never used smokeless tobacco  He reports previous alcohol use  He reports that he does not use drugs    Current Outpatient Medications   Medication Sig Dispense Refill    Ascorbic Acid (VITAMIN C) 100 MG tablet Take 100 mg by mouth daily      Cholecalciferol ([facility] D3) 5000 units capsule Take 1 capsule by mouth daily      Glucosamine-Chondroitin 0069-9614 MG/30ML LIQD Take by mouth      Magnesium Gluconate (MAGNESIUM 27 PO) Take by mouth      multivitamin (THERAGRAN) TABS Take 1 tablet by mouth daily      Nutritional Supplements (CREATINE) 750 MG CAPS Take by mouth      olmesartan-hydrochlorothiazide (BENICAR HCT) 20-12 5 MG per tablet Take 1 tablet by mouth daily 90 tablet 1    pantoprazole (PROTONIX) 40 mg tablet TAKE 1 TABLET BY MOUTH EVERY DAY 90 tablet 0    sildenafil (REVATIO) 20 mg tablet Take 1 tablet (20 mg total) by mouth daily as needed (Erectile dysfunction) 50 tablet 3    verapamil (CALAN-SR) 240 mg CR tablet Take 1 tablet (240 mg total) by mouth daily at bedtime 90 tablet 1    vitamin E, tocopherol, 400 units capsule Take by mouth      cyclobenzaprine (FLEXERIL) 10 mg tablet Take 1 tablet (10 mg total) by mouth 3 (three) times a day as needed for muscle spasms 15 tablet 1    meloxicam (MOBIC) 15 mg tablet Take 1 tablet (15 mg total) by mouth daily 30 tablet 1    methylPREDNISolone 4 MG tablet therapy pack Use as directed on package 21 each 0     No current facility-administered medications for this visit  Current Outpatient Medications on File Prior to Visit   Medication Sig    Ascorbic Acid (VITAMIN C) 100 MG tablet Take 100 mg by mouth daily    Cholecalciferol (CVS D3) 5000 units capsule Take 1 capsule by mouth daily    Glucosamine-Chondroitin 8286-4061 MG/30ML LIQD Take by mouth    Magnesium Gluconate (MAGNESIUM 27 PO) Take by mouth    multivitamin (THERAGRAN) TABS Take 1 tablet by mouth daily    Nutritional Supplements (CREATINE) 750 MG CAPS Take by mouth    pantoprazole (PROTONIX) 40 mg tablet TAKE 1 TABLET BY MOUTH EVERY DAY    sildenafil (REVATIO) 20 mg tablet Take 1 tablet (20 mg total) by mouth daily as needed (Erectile dysfunction)    verapamil (CALAN-SR) 240 mg CR tablet Take 1 tablet (240 mg total) by mouth daily at bedtime    vitamin E, tocopherol, 400 units capsule Take by mouth     No current facility-administered medications on file prior to visit  He has No Known Allergies       Review of Systems   Constitutional: Negative  Negative for chills and fever  HENT: Negative  Respiratory: Negative      Cardiovascular: Negative  Musculoskeletal: Positive for arthralgias, back pain and myalgias  Objective:      /90 (BP Location: Right arm, Patient Position: Sitting, Cuff Size: Large)   Pulse 60   Temp 98 2 °F (36 8 °C) (Temporal)   Resp 16   Ht 5' 7" (1 702 m)   Wt 103 kg (227 lb)   SpO2 98%   BMI 35 55 kg/m²          Physical Exam  Constitutional:       Appearance: Normal appearance  He is obese  HENT:      Head: Normocephalic and atraumatic  Musculoskeletal: Normal range of motion  Comments: Neg pain over bisceps  Able to abduct > 90 deg  Neurological:      Mental Status: He is alert

## 2021-05-04 DIAGNOSIS — G89.29 CHRONIC LOW BACK PAIN, UNSPECIFIED BACK PAIN LATERALITY, UNSPECIFIED WHETHER SCIATICA PRESENT: Primary | ICD-10-CM

## 2021-05-04 DIAGNOSIS — M54.50 CHRONIC LOW BACK PAIN, UNSPECIFIED BACK PAIN LATERALITY, UNSPECIFIED WHETHER SCIATICA PRESENT: Primary | ICD-10-CM

## 2021-05-24 ENCOUNTER — OFFICE VISIT (OUTPATIENT)
Dept: OBGYN CLINIC | Facility: CLINIC | Age: 66
End: 2021-05-24
Payer: COMMERCIAL

## 2021-05-24 VITALS
HEIGHT: 67 IN | SYSTOLIC BLOOD PRESSURE: 151 MMHG | WEIGHT: 225 LBS | RESPIRATION RATE: 18 BRPM | HEART RATE: 61 BPM | BODY MASS INDEX: 35.31 KG/M2 | DIASTOLIC BLOOD PRESSURE: 84 MMHG

## 2021-05-24 DIAGNOSIS — M51.36 DEGENERATIVE DISC DISEASE, LUMBAR: ICD-10-CM

## 2021-05-24 DIAGNOSIS — M62.9 HAMSTRING TIGHTNESS OF BOTH LOWER EXTREMITIES: ICD-10-CM

## 2021-05-24 DIAGNOSIS — M47.816 LUMBAR FACET ARTHROPATHY: Primary | ICD-10-CM

## 2021-05-24 DIAGNOSIS — R19.8 ABDOMINAL WEAKNESS: ICD-10-CM

## 2021-05-24 DIAGNOSIS — S39.012A LUMBAR STRAIN, INITIAL ENCOUNTER: ICD-10-CM

## 2021-05-24 PROCEDURE — 1160F RVW MEDS BY RX/DR IN RCRD: CPT | Performed by: FAMILY MEDICINE

## 2021-05-24 PROCEDURE — 1036F TOBACCO NON-USER: CPT | Performed by: FAMILY MEDICINE

## 2021-05-24 PROCEDURE — 3008F BODY MASS INDEX DOCD: CPT | Performed by: FAMILY MEDICINE

## 2021-05-24 PROCEDURE — 3077F SYST BP >= 140 MM HG: CPT | Performed by: FAMILY MEDICINE

## 2021-05-24 PROCEDURE — 99244 OFF/OP CNSLTJ NEW/EST MOD 40: CPT | Performed by: FAMILY MEDICINE

## 2021-05-24 PROCEDURE — 3079F DIAST BP 80-89 MM HG: CPT | Performed by: FAMILY MEDICINE

## 2021-05-24 NOTE — LETTER
May 24, 2021     Patient: Robina Osorio   YOB: 1955   Date of Visit: 5/24/2021       To Whom it May Concern:    Robina Osorio is under my professional care  He was seen in my office on 5/24/2021  If you have any questions or concerns, please don't hesitate to call           Sincerely,          [employer], DO        CC: No Recipients

## 2021-05-24 NOTE — LETTER
May 25, 2021     Patient: Александр Ayers   YOB: 1955   Date of Visit: 5/24/2021       To Whom it May Concern:    Baby Andria is under my professional care  He was seen in my office on 5/24/2021  He is unable to return to work at this time  He will be re-evaluated in 8 weeks  If you have any questions or concerns, please don't hesitate to call           Sincerely,          [employer], DO        CC: No Recipients

## 2021-05-24 NOTE — LETTER
May 24, 2021     Reyna NayanaDO claus  [address] [facility] [address]    Patient: Gina Bennett   YOB: 1955   Date of Visit: 5/24/2021       Dear Dr Raji Dalal:    Thank you for referring Gina Bennett to me for evaluation  Below are my notes for this consultation  If you have questions, please do not hesitate to call me  I look forward to following your patient along with you  Sincerely,        [employer], DO        CC: No Recipients  [employer], DO  5/24/2021  4:46 PM  Sign when Signing Visit  Assessment/Plan:  Assessment/Plan   Diagnoses and all orders for this visit:    Lumbar facet arthropathy  -     Ambulatory referral to Physical Therapy; Future    Degenerative disc disease, lumbar  -     Ambulatory referral to Physical Therapy; Future    Lumbar strain, initial encounter  -     Ambulatory referral to Physical Therapy; Future    Abdominal weakness  -     Ambulatory referral to Physical Therapy; Future    Hamstring tightness of both lower extremities  -     Ambulatory referral to Physical Therapy; Future        51-year-old male with low back pain 1 more than 6 weeks duration  Discussed with patient physical exam, radiographs, impression and plan  X-rays lumbar spine noted for multilevel facet arthropathy and degenerative disc disease most pronounced at L2-L3  Physical exam noted for mild midline tenderness L2-L4  He has limited range of motion of lumbar spine with rotating to the right, side bending to both sides, and with extension  He has intact strength, sensation, and patellar reflex in both lower extremities  There is no groin pain with GABRIEL and FADDIR maneuvers of both hips  Clinical impression that he is symptomatic from lumbar spine pathology and strain of lumbar spine  I discussed treatment regimen of supplements, Tylenol, muscle relaxer, and physical therapy    He has history of Sutherland's esophagus and should limited his intake of NSAIDs  He may take Tylenol as needed  He may continue with taking cyclobenzaprine as needed but not before driving  He is to start taking tumeric 500 mg twice daily, tart cherry at least 1000 mg daily, and glucosamine -chondroitin 2 to 3 times a day  He is to start physical therapy as soon as possible and do home exercises as directed  He may benefit from electrical stimulation so I will refer him for home electrical stimulation unit with [facility] to help with support and function  He will follow up in 8 weeks at which point he will be re-evaluated  electrical stimulation unit with [facility]         75-year-old male presents for evaluation of low back pain of more than 6 weeks duration  He was swinging his left leg to get into the bucket of a truck when he felt pain of the low back  He had pain described as sudden in onset, localized to the lumbar spine but worse on the left, throbbing and sharp, nonradiating, severe intensity, associated muscle spasm and limited range of motion, worse with attempted movement, and improved with resting  He denies numbness or tingling in lower extremities  He took and Aleve and was able to continue working, and states that after about 20 minutes pain significantly subsided  The next day he woke up and had return of pain, however not as intense as the previous day  He was able to continue at work however he refrained from strenuous activity  He saw his primary care provider and was prescribed meloxicam, methylprednisone Dosepak, and cyclobenzaprine  He was referred for x-rays which were noted for degenerative changes and referred to orthopedic care  He reports having taking meloxicam for about 1 week and he completed steroid pack as directed    Since that time he has been having pain described as generalized to lumbar spine but worse on the left, achy and sore and tight feeling, worse with activity, and improved with rest   He has not needed to take any medication over the past few weeks  He denies bowel or bladder incontinence  He states he will be retiring in a few weeks  Subjective:   Patient ID: Margaux Jesus is a 72 y o  male  Chief Complaint   Patient presents with    Spine - Pain       42-year-old male presents for evaluation of low back pain of more than 6 weeks duration  He was swinging his left leg to get into the bucket of a truck when he felt pain of the low back  He had pain described as sudden in onset, localized to the lumbar spine but worse on the left, throbbing and sharp, nonradiating, severe intensity, associated muscle spasm and limited range of motion, worse with attempted movement, and improved with resting  He denies numbness or tingling in lower extremities  He took and Aleve and was able to continue working, and states that after about 20 minutes pain significantly subsided  The next day he woke up and had return of pain, however not as intense as the previous day  He was able to continue at work however he refrained from strenuous activity  He saw his primary care provider and was prescribed meloxicam, methylprednisone Dosepak, and cyclobenzaprine  He was referred for x-rays which were noted for degenerative changes and referred to orthopedic care  He reports having taking meloxicam for about 1 week and he completed steroid pack as directed  Since that time he has been having pain described as generalized to lumbar spine but worse on the left, achy and sore and tight feeling, worse with activity, and improved with rest   He has not needed to take any medication over the past few weeks  He denies bowel or bladder incontinence  He states he will be retiring in a few weeks  Back Pain  This is a new problem  The current episode started more than 1 month ago  The problem occurs daily  The problem has been waxing and waning  Associated symptoms include arthralgias   Pertinent negatives include no abdominal pain, chest pain, chills, fever, joint swelling, numbness, rash, sore throat or weakness  The symptoms are aggravated by twisting and bending  He has tried rest and NSAIDs (Muscle relaxer, oral steroid) for the symptoms  The treatment provided mild relief  The following portions of the patient's history were reviewed and updated as appropriate: He  has a past medical history of Sutherland's esophagus, Elevated liver function tests, GERD (gastroesophageal reflux disease), Helicobacter pylori (H  pylori) infection, Hypertension, and Lyme disease  He  has a past surgical history that includes Hernia repair; Bony pelvis surgery; and pr esophagogastroduodenoscopy transoral diagnostic (N/A, 10/18/2017)  His family history includes Hypotension in his mother; Intracerebral hemorrhage in his father; Stroke in his father  He  reports that he has never smoked  He has never used smokeless tobacco  He reports previous alcohol use  He reports that he does not use drugs  He has No Known Allergies       Review of Systems   Constitutional: Negative for chills and fever  HENT: Negative for sore throat  Eyes: Negative for visual disturbance  Respiratory: Negative for shortness of breath  Cardiovascular: Negative for chest pain  Gastrointestinal: Negative for abdominal pain  Genitourinary: Negative for flank pain  Musculoskeletal: Positive for arthralgias and back pain  Negative for joint swelling  Skin: Negative for rash and wound  Neurological: Negative for weakness and numbness  Hematological: Does not bruise/bleed easily  Psychiatric/Behavioral: Negative for self-injury         Objective:  Vitals:    05/24/21 1539   BP: 151/84   Pulse: 61   Resp: 18   Weight: 102 kg (225 lb)   Height: 5' 7" (1 702 m)     Right Ankle Exam     Muscle Strength   Dorsiflexion:  5/5  Plantar flexion:  5/5      Left Ankle Exam     Muscle Strength   Dorsiflexion:  5/5   Plantar flexion:  5/5       Right Hip Exam     Muscle Strength   Flexion: 5/5 Tests   GABRIEL: negative    Comments:  Negative FADDIR   hamstring tightness      Left Hip Exam     Muscle Strength   Flexion: 5/5     Tests   GABRIEL: negative    Comments:  Negative FADDIR    Hamstring tightness      Back Exam     Tenderness   Back tenderness location: Midline tenderness L2-L4, bilateral paraspinal hypertonicity  Range of Motion   Extension: abnormal   Flexion: normal   Lateral bend right: abnormal   Lateral bend left: abnormal   Rotation right: abnormal   Rotation left: normal     Muscle Strength   Right Quadriceps:  5/5   Left Quadriceps:  5/5     Tests   Straight leg raise right: negative  Straight leg raise left: negative    Reflexes   Patellar: normal    Other   Sensation: normal  Gait: normal           Strength/Myotome Testing     Left Ankle/Foot   Dorsiflexion: 5  Plantar flexion: 5    Right Ankle/Foot   Dorsiflexion: 5  Plantar flexion: 5      Physical Exam  Vitals signs and nursing note reviewed  Constitutional:       General: He is not in acute distress  Appearance: He is well-developed  He is not ill-appearing or diaphoretic  HENT:      Head: Normocephalic and atraumatic  Right Ear: External ear normal       Left Ear: External ear normal    Eyes:      Conjunctiva/sclera: Conjunctivae normal    Neck:      Trachea: No tracheal deviation  Cardiovascular:      Rate and Rhythm: Normal rate  Pulmonary:      Effort: Pulmonary effort is normal  No respiratory distress  Abdominal:      General: There is no distension  Musculoskeletal:         General: Tenderness present  No swelling, deformity or signs of injury  Skin:     General: Skin is warm and dry  Coloration: Skin is not jaundiced or pale  Neurological:      Mental Status: He is alert and oriented to person, place, and time  Psychiatric:         Mood and Affect: Mood normal          Behavior: Behavior normal          Thought Content:  Thought content normal          Judgment: Judgment normal          I have personally reviewed pertinent films in PACS and my interpretation is Degenerative disc disease most pronounced at L2-L3

## 2021-05-24 NOTE — PROGRESS NOTES
Assessment/Plan:  Assessment/Plan   Diagnoses and all orders for this visit:    Lumbar facet arthropathy  -     Ambulatory referral to Physical Therapy; Future    Degenerative disc disease, lumbar  -     Ambulatory referral to Physical Therapy; Future    Lumbar strain, initial encounter  -     Ambulatory referral to Physical Therapy; Future    Abdominal weakness  -     Ambulatory referral to Physical Therapy; Future    Hamstring tightness of both lower extremities  -     Ambulatory referral to Physical Therapy; Future        70-year-old male with low back pain 1 more than 6 weeks duration  Discussed with patient physical exam, radiographs, impression and plan  X-rays lumbar spine noted for multilevel facet arthropathy and degenerative disc disease most pronounced at L2-L3  Physical exam noted for mild midline tenderness L2-L4  He has limited range of motion of lumbar spine with rotating to the right, side bending to both sides, and with extension  He has intact strength, sensation, and patellar reflex in both lower extremities  There is no groin pain with GABRIEL and FADDIR maneuvers of both hips  Clinical impression that he is symptomatic from lumbar spine pathology and strain of lumbar spine  I discussed treatment regimen of supplements, Tylenol, muscle relaxer, and physical therapy  He has history of Sutherland's esophagus and should limited his intake of NSAIDs  He may take Tylenol as needed  He may continue with taking cyclobenzaprine as needed but not before driving  He is to start taking tumeric 500 mg twice daily, tart cherry at least 1000 mg daily, and glucosamine -chondroitin 2 to 3 times a day  He is to start physical therapy as soon as possible and do home exercises as directed  He may benefit from electrical stimulation so I will refer him for home electrical stimulation unit with [facility] to help with support and function  He will follow up in 8 weeks at which point he will be re-evaluated  electrical stimulation unit with [facility]         Subjective:   Patient ID: Nadeem Olmstead is a 72 y o  male  Chief Complaint   Patient presents with    Spine - Pain       80-year-old male presents for evaluation of low back pain of more than 6 weeks duration  He was swinging his left leg to get into the bucket of a truck when he felt pain of the low back  He had pain described as sudden in onset, localized to the lumbar spine but worse on the left, throbbing and sharp, nonradiating, severe intensity, associated muscle spasm and limited range of motion, worse with attempted movement, and improved with resting  He denies numbness or tingling in lower extremities  He took and Aleve and was able to continue working, and states that after about 20 minutes pain significantly subsided  The next day he woke up and had return of pain, however not as intense as the previous day  He was able to continue at work however he refrained from strenuous activity  He saw his primary care provider and was prescribed meloxicam, methylprednisone Dosepak, and cyclobenzaprine  He was referred for x-rays which were noted for degenerative changes and referred to orthopedic care  He reports having taking meloxicam for about 1 week and he completed steroid pack as directed  Since that time he has been having pain described as generalized to lumbar spine but worse on the left, achy and sore and tight feeling, worse with activity, and improved with rest   He has not needed to take any medication over the past few weeks  He denies bowel or bladder incontinence  He states he will be retiring in a few weeks  Back Pain  This is a new problem  The current episode started more than 1 month ago  The problem occurs daily  The problem has been waxing and waning  Associated symptoms include arthralgias  Pertinent negatives include no abdominal pain, chest pain, chills, fever, joint swelling, numbness, rash, sore throat or weakness   The symptoms are aggravated by twisting and bending  He has tried rest and NSAIDs (Muscle relaxer, oral steroid) for the symptoms  The treatment provided mild relief  The following portions of the patient's history were reviewed and updated as appropriate: He  has a past medical history of Sutherland's esophagus, Elevated liver function tests, GERD (gastroesophageal reflux disease), Helicobacter pylori (H  pylori) infection, Hypertension, and Lyme disease  He  has a past surgical history that includes Hernia repair; Bony pelvis surgery; and pr esophagogastroduodenoscopy transoral diagnostic (N/A, 10/18/2017)  His family history includes Hypotension in his mother; Intracerebral hemorrhage in his father; Stroke in his father  He  reports that he has never smoked  He has never used smokeless tobacco  He reports previous alcohol use  He reports that he does not use drugs  He has No Known Allergies       Review of Systems   Constitutional: Negative for chills and fever  HENT: Negative for sore throat  Eyes: Negative for visual disturbance  Respiratory: Negative for shortness of breath  Cardiovascular: Negative for chest pain  Gastrointestinal: Negative for abdominal pain  Genitourinary: Negative for flank pain  Musculoskeletal: Positive for arthralgias and back pain  Negative for joint swelling  Skin: Negative for rash and wound  Neurological: Negative for weakness and numbness  Hematological: Does not bruise/bleed easily  Psychiatric/Behavioral: Negative for self-injury         Objective:  Vitals:    05/24/21 1539   BP: 151/84   Pulse: 61   Resp: 18   Weight: 102 kg (225 lb)   Height: 5' 7" (1 702 m)     Right Ankle Exam     Muscle Strength   Dorsiflexion:  5/5  Plantar flexion:  5/5      Left Ankle Exam     Muscle Strength   Dorsiflexion:  5/5   Plantar flexion:  5/5       Right Hip Exam     Muscle Strength   Flexion: 5/5     Tests   GABRIEL: negative    Comments:  Negative FADDIR   hamstring tightness      Left Hip Exam     Muscle Strength   Flexion: 5/5     Tests   GABRIEL: negative    Comments:  Negative FADDIR    Hamstring tightness      Back Exam     Tenderness   Back tenderness location: Midline tenderness L2-L4, bilateral paraspinal hypertonicity  Range of Motion   Extension: abnormal   Flexion: normal   Lateral bend right: abnormal   Lateral bend left: abnormal   Rotation right: abnormal   Rotation left: normal     Muscle Strength   Right Quadriceps:  5/5   Left Quadriceps:  5/5     Tests   Straight leg raise right: negative  Straight leg raise left: negative    Reflexes   Patellar: normal    Other   Sensation: normal  Gait: normal           Strength/Myotome Testing     Left Ankle/Foot   Dorsiflexion: 5  Plantar flexion: 5    Right Ankle/Foot   Dorsiflexion: 5  Plantar flexion: 5      Physical Exam  Vitals signs and nursing note reviewed  Constitutional:       General: He is not in acute distress  Appearance: He is well-developed  He is not ill-appearing or diaphoretic  HENT:      Head: Normocephalic and atraumatic  Right Ear: External ear normal       Left Ear: External ear normal    Eyes:      Conjunctiva/sclera: Conjunctivae normal    Neck:      Trachea: No tracheal deviation  Cardiovascular:      Rate and Rhythm: Normal rate  Pulmonary:      Effort: Pulmonary effort is normal  No respiratory distress  Abdominal:      General: There is no distension  Musculoskeletal:         General: Tenderness present  No swelling, deformity or signs of injury  Skin:     General: Skin is warm and dry  Coloration: Skin is not jaundiced or pale  Neurological:      Mental Status: He is alert and oriented to person, place, and time  Psychiatric:         Mood and Affect: Mood normal          Behavior: Behavior normal          Thought Content:  Thought content normal          Judgment: Judgment normal          I have personally reviewed pertinent films in PACS and my interpretation is Degenerative disc disease most pronounced at L2-L3

## 2021-05-25 ENCOUNTER — TELEPHONE (OUTPATIENT)
Dept: OBGYN CLINIC | Facility: HOSPITAL | Age: 66
End: 2021-05-25

## 2021-05-25 NOTE — TELEPHONE ENCOUNTER
Patient is requesting a letter for time off from work for the 6 weeks while taking PT   Please call patient when in chart

## 2021-05-25 NOTE — TELEPHONE ENCOUNTER
Spoke to patient  Advised above stated he will be in the office today to get the letter  Verbalized understanding

## 2021-05-25 NOTE — TELEPHONE ENCOUNTER
Please advise patient that updated note has been placed in his chart  He may  note from the office at his earliest convenience        Thanks

## 2021-05-27 ENCOUNTER — TELEPHONE (OUTPATIENT)
Dept: OBGYN CLINIC | Facility: OTHER | Age: 66
End: 2021-05-27

## 2021-06-08 ENCOUNTER — EVALUATION (OUTPATIENT)
Dept: PHYSICAL THERAPY | Facility: CLINIC | Age: 66
End: 2021-06-08
Payer: COMMERCIAL

## 2021-06-08 DIAGNOSIS — S39.012D LUMBAR STRAIN, SUBSEQUENT ENCOUNTER: ICD-10-CM

## 2021-06-08 DIAGNOSIS — M51.36 DEGENERATIVE DISC DISEASE, LUMBAR: ICD-10-CM

## 2021-06-08 DIAGNOSIS — R19.8 ABDOMINAL WEAKNESS: ICD-10-CM

## 2021-06-08 DIAGNOSIS — M47.816 LUMBAR FACET ARTHROPATHY: Primary | ICD-10-CM

## 2021-06-08 DIAGNOSIS — M62.9 HAMSTRING TIGHTNESS OF BOTH LOWER EXTREMITIES: ICD-10-CM

## 2021-06-08 PROCEDURE — 97162 PT EVAL MOD COMPLEX 30 MIN: CPT | Performed by: PHYSICAL THERAPIST

## 2021-06-08 NOTE — PROGRESS NOTES
PT Evaluation     Today's date: 2021  Patient name: Vahe Campbell  : 1955  MRN: [id number]  Referring provider: Ghislaine Grullon DO  Dx:   Encounter Diagnosis     ICD-10-CM    1  Lumbar facet arthropathy  M47 816 Ambulatory referral to Physical Therapy   2  Degenerative disc disease, lumbar  M51 36 Ambulatory referral to Physical Therapy   3  Lumbar strain, subsequent encounter  S39 012D    4  Abdominal weakness  R19 8 Ambulatory referral to Physical Therapy   5  Hamstring tightness of both lower extremities  M62 9 Ambulatory referral to Physical Therapy       Start Time: 1352  Stop Time: 920  Total time in clinic (min): 63 minutes    Assessment  Assessment details: Vahe Campbell is a 72 y o  male who presents with pain, decreased strength, decreased ROM, decreased joint mobility, impaired sensation and postural  dysfunction  Due to these impairments, Patient has difficulty performing a/iadls, recreational activities and engaging in social activities  Patient's clinical presentation is consistent with their referring diagnosis of lumbar DDD  Patient would benefit from skilled physical therapy to address their aforementioned impairments, improve their level of function and to improve their overall quality of life  Impairments: abnormal muscle firing, abnormal or restricted ROM, activity intolerance, impaired physical strength, lacks appropriate home exercise program, pain with function, weight-bearing intolerance and poor posture   Understanding of Dx/Px/POC: excellent  Goals  Short Term Goals: to be achieved by 4 weeks  1) Patient to be independent with basic HEP  2) Decrease pain to 4/10 at its worst   3) Increase lumbar spine ROM by 25% in all deficient planes  4) Increase LE strength by 1/2 MMT grade in all deficient planes  5) Patient to report decreased sleep interruption secondary to pain  6) Increase ambulatory tolerance by 10 min      Long Term Goals: to be achieved by discharge  1) FOTO equal to or greater than TBD  2) Patient to be independent with comprehensive HEP  3) Abolish pain for improved quality of life  4) Lumbar spine ROM WNL all planes to improve a/iadls  5) Increase LE strength to 5/5 MMT grade in all planes to improve a/iadls  6) Patient to report no sleep interruption secondary to pain  7) Increase ambulatory tolerance to 60 min  Plan  Patient would benefit from: skilled PT  Planned modality interventions: biofeedback, cryotherapy, TENS, thermotherapy: hydrocollator packs, unattended electrical stimulation and low level laser therapy  Planned therapy interventions: abdominal trunk stabilization, activity modification, ADL retraining, ADL training, behavior modification, body mechanics training, breathing training, functional ROM exercises, home exercise program, IADL retraining, joint mobilization, manual therapy, massage, motor coordination training, neuromuscular re-education, patient education, postural training, self care, strengthening, stretching, therapeutic activities, therapeutic exercise and transfer training  Frequency: 2-3x week  Duration in weeks: 12  Plan of Care beginning date: 6/8/2021  Plan of Care expiration date: 8/31/2021  Treatment plan discussed with: patient        Subjective Evaluation    History of Present Illness  Mechanism of injury: Patient reports that in April he climbed into a bucket truck at work and went to swing his legs over when he felt a severe pain in his left-lower back  Patient was able to climb down, take Aleve and finish his work  The following day his pain did not go away and he went to his PCP who prescribed oral steroids, Meloxicam and muscle relaxants  Patient underwent radiographic imaging, which demonstrated: "There is disc degeneration in the lower thoracic spine and at L2-L3 which is severe  Similar to prior   Spondylotic changes lower lumbar spine L5-S1 and degenerative arthritis of the facet joints L4-L5 and L5-S1 stable " Patient states that his pain has lessened since taking his medication  Patient does have new onset of crunching in his back and weakness in his legs when negotiating steps  Patient has been unable to sleep on his stomach d/t his back getting locked up, but otherwise his sleep has been normal  Patient has intermittent tingling/numbness along his right lateral thigh and lower leg  Patient denies bowel/bladder changes  Patient's next f/u appointment with his PCP is scheduled for 6/15 and ortho   Pain  Current pain ratin  At best pain ratin  At worst pain ratin  Location: left lower back  Quality: dull ache and sharp  Alleviating factors: sitting, medication  Exacerbated by: twisting, prolonged standing (30-45 min)    Social Support    Employment status: not working  Patient Goals  Patient goals for therapy: decreased pain and increased motion          Objective     Postural Observations  Seated posture: fair  Standing posture: good        Palpation   Left   Muscle spasm in the erector spinae and lumbar paraspinals  Tenderness of the erector spinae and lumbar paraspinals  Right   Muscle spasm in the erector spinae and lumbar paraspinals  Tenderness of the erector spinae and lumbar paraspinals  Tenderness     Lumbar Spine  Tenderness in the spinous process and left transverse process  No tenderness in the right transverse process  Neurological Testing     Sensation     Lumbar   Left   Intact: light touch    Right   Intact: light touch    Reflexes   Left   Patellar (L4): normal (2+)  Achilles (S1): normal (2+)    Right   Patellar (L4): normal (2+)  Achilles (S1): normal (2+)    Active Range of Motion     Lumbar   Flexion: Active lumbar flexion: (+) left-sided low back tightness  WFL  Extension: Active lumbar extension: (+) left-sided pressure    with pain Restriction level: moderate  Left lateral flexion:  Restriction level: minimal  Right lateral flexion:  Restriction level: minimal  Left rotation:  with pain Restriction level: moderate  Right rotation:  with pain Restriction level: moderate    Passive Range of Motion   Left Hip   Normal passive range of motion    Right Hip   Normal passive range of motion    Joint Play     Hypomobile: L1, L2, L3, L4, L5 and S1     Pain: L1, L2, L3, L4, L5 and S1     Strength/Myotome Testing     Lumbar   Left   Heel walk: normal  Toe walk: normal    Right   Heel walk: normal  Toe walk: normal    Left Hip   Planes of Motion   Flexion: 4    Right Hip   Planes of Motion   Flexion: 4+    Left Knee   Flexion: 5  Extension: 5    Right Knee   Flexion: 5  Extension: 5    Left Ankle/Foot   Dorsiflexion: 5    Right Ankle/Foot   Dorsiflexion: 5    Muscle Activation     Additional Muscle Activation Details  Transverse abdominus activation: poor    Tests     Lumbar     Left   Negative crossed SLR, passive SLR and slump test      Right   Negative crossed SLR, passive SLR and slump test      Left Hip   Negative GABRIEL, FADIR and long sit  Right Hip   Positive long sit (R > L - unchanging)  Negative GABRIEL and FADIR  Ambulation     Ambulation: Level Surfaces   Ambulation without assistive device: independent    Observational Gait   Gait: within functional limits     Functional Assessment      Squat    Left within functional limits and right within functional limits  Precautions: standard      Manuals 6/8                                                                Neuro Re-Ed             PPT             PPT with marching             Webslide: low row             Seated multifidus rotation                                                    Ther Ex             Nustep             SKC             Quadruped rocking             Bridging             Supine h/s str  Supine hip flexor str  Piriformis str                            Ther Activity                                       Gait Training Modalities

## 2021-06-14 ENCOUNTER — OFFICE VISIT (OUTPATIENT)
Dept: PHYSICAL THERAPY | Facility: CLINIC | Age: 66
End: 2021-06-14
Payer: COMMERCIAL

## 2021-06-14 DIAGNOSIS — M62.9 HAMSTRING TIGHTNESS OF BOTH LOWER EXTREMITIES: ICD-10-CM

## 2021-06-14 DIAGNOSIS — M47.816 LUMBAR FACET ARTHROPATHY: Primary | ICD-10-CM

## 2021-06-14 DIAGNOSIS — S39.012D LUMBAR STRAIN, SUBSEQUENT ENCOUNTER: ICD-10-CM

## 2021-06-14 DIAGNOSIS — M51.36 DEGENERATIVE DISC DISEASE, LUMBAR: ICD-10-CM

## 2021-06-14 DIAGNOSIS — R19.8 ABDOMINAL WEAKNESS: ICD-10-CM

## 2021-06-14 PROCEDURE — 97112 NEUROMUSCULAR REEDUCATION: CPT | Performed by: PHYSICAL THERAPIST

## 2021-06-14 PROCEDURE — 97110 THERAPEUTIC EXERCISES: CPT | Performed by: PHYSICAL THERAPIST

## 2021-06-14 NOTE — PROGRESS NOTES
Daily Note     Today's date: 2021  Patient name: Pepito Harvey  : 1955  MRN: [id number]  Referring provider: Lillian Nathan DO  Dx:   Encounter Diagnosis     ICD-10-CM    1  Lumbar facet arthropathy  M47 816    2  Degenerative disc disease, lumbar  M51 36    3  Lumbar strain, subsequent encounter  S39 012D    4  Abdominal weakness  R19 8    5  Hamstring tightness of both lower extremities  M62 9        Start Time: 1769  Stop Time: 1005  Total time in clinic (min): 70 minutes    Subjective: Patient reports no significant changes to overall status since previous treatment session (IE)  Objective: See treatment diary below      Assessment: Tolerated treatment well  Patient demonstrated fatigue post treatment and would benefit from continued PT  Patient reported decreased tightness at end of session and minimal soreness  Patient with poor neuromuscular control and proprioceptive awareness when performing PPT  Plan: Continue per plan of care  Progress treatment as tolerated  Precautions: standard      Manuals                                                                Neuro Re-Ed             PPT  20x5"           PPT with vipin Staffordde: low row             Seated multifidus rotation                                                    Ther Ex             Nustep  10 min L5           DKC with PB  10x10"           Quadruped rocking             Bridging  2x10 3"           Supine h/s str  3x30"           Supine hip flexor str  3x30"           Piriformis str    3x30"                        Ther Activity                                       Gait Training                                       Modalities

## 2021-06-16 ENCOUNTER — OFFICE VISIT (OUTPATIENT)
Dept: PHYSICAL THERAPY | Facility: CLINIC | Age: 66
End: 2021-06-16
Payer: COMMERCIAL

## 2021-06-16 DIAGNOSIS — M47.816 LUMBAR FACET ARTHROPATHY: Primary | ICD-10-CM

## 2021-06-16 DIAGNOSIS — R19.8 ABDOMINAL WEAKNESS: ICD-10-CM

## 2021-06-16 DIAGNOSIS — M62.9 HAMSTRING TIGHTNESS OF BOTH LOWER EXTREMITIES: ICD-10-CM

## 2021-06-16 DIAGNOSIS — M51.36 DEGENERATIVE DISC DISEASE, LUMBAR: ICD-10-CM

## 2021-06-16 DIAGNOSIS — S39.012D LUMBAR STRAIN, SUBSEQUENT ENCOUNTER: ICD-10-CM

## 2021-06-16 PROCEDURE — 97110 THERAPEUTIC EXERCISES: CPT

## 2021-06-16 PROCEDURE — 97112 NEUROMUSCULAR REEDUCATION: CPT

## 2021-06-16 NOTE — PROGRESS NOTES
Daily Note     Today's date: 2021  Patient name: Brian Garcia  : 1955  MRN: [id number]  Referring provider: Bryan Cullen DO  Dx:   Encounter Diagnosis     ICD-10-CM    1  Lumbar facet arthropathy  M47 816    2  Degenerative disc disease, lumbar  M51 36    3  Lumbar strain, subsequent encounter  S39 012D    4  Abdominal weakness  R19 8    5  Hamstring tightness of both lower extremities  M62 9                   Subjective: Pt states he is feeling pretty good today a bit sore after his last PT session but that went away after a day or so  Objective: See treatment diary below      Assessment: Tolerated treatment well with no increase in pian and min to moderate fatigue  Pt presents with hip flexor restriction seen bilaterally as he was given kneeling hip flexor stretch for at home  Pt had decreased pain with PPT and DKTC  Patient would benefit from continued PT  Plan: Continue per plan of care  Progress treatment as tolerated  Precautions: standard      Manuals                                                               Neuro Re-Ed             PPT  20x5" 20 x 5"           PPT with vipin Diaz: low row             Seated multifidus rotation                                                    Ther Ex             Nustep  10 min L5 10 mins           DKC with PB  10x10" 10 x10"           Quadruped rocking   mirian pose   5 x 10"           Bridging  2x10 3" 2  x10 x 3"           Supine h/s str  3x30" 3  X30"           Supine hip flexor str  3x30" 3 x 30"           Piriformis str    3x30" 3 x30"                        Ther Activity                                       Gait Training                                       Modalities

## 2021-06-21 ENCOUNTER — OFFICE VISIT (OUTPATIENT)
Dept: PHYSICAL THERAPY | Facility: CLINIC | Age: 66
End: 2021-06-21
Payer: COMMERCIAL

## 2021-06-21 DIAGNOSIS — R19.8 ABDOMINAL WEAKNESS: ICD-10-CM

## 2021-06-21 DIAGNOSIS — M51.36 DEGENERATIVE DISC DISEASE, LUMBAR: ICD-10-CM

## 2021-06-21 DIAGNOSIS — M62.9 HAMSTRING TIGHTNESS OF BOTH LOWER EXTREMITIES: ICD-10-CM

## 2021-06-21 DIAGNOSIS — S39.012D LUMBAR STRAIN, SUBSEQUENT ENCOUNTER: ICD-10-CM

## 2021-06-21 DIAGNOSIS — M47.816 LUMBAR FACET ARTHROPATHY: Primary | ICD-10-CM

## 2021-06-21 PROCEDURE — 97032 APPL MODALITY 1+ESTIM EA 15: CPT | Performed by: PHYSICAL THERAPIST

## 2021-06-21 PROCEDURE — 97112 NEUROMUSCULAR REEDUCATION: CPT | Performed by: PHYSICAL THERAPIST

## 2021-06-21 PROCEDURE — 97110 THERAPEUTIC EXERCISES: CPT | Performed by: PHYSICAL THERAPIST

## 2021-06-21 NOTE — PROGRESS NOTES
Daily Note     Today's date: 2021  Patient name: Vahe Campbell  : 1955  MRN: [id number]  Referring provider: Ghilsaine Grullon DO  Dx:   Encounter Diagnosis     ICD-10-CM    1  Lumbar facet arthropathy  M47 816    2  Degenerative disc disease, lumbar  M51 36    3  Lumbar strain, subsequent encounter  S39 012D    4  Abdominal weakness  R19 8    5  Hamstring tightness of both lower extremities  M62 9        Start Time: 0845  Stop Time: 05  Total time in clinic (min): 52 minutes    Subjective: Patient reports that his back has been feeling good lately  Objective: See treatment diary below      Assessment: Tolerated treatment well  Patient demonstrated fatigue post treatment and would benefit from continued PT  Updated and instructed Patient in HEP  Reviewed home TENS unit with precautions and contraindications for safe use  Patient able to verbalize and express his understanding  Plan: Continue per plan of care  Progress treatment as tolerated  Precautions: standard      Manuals                                                              Neuro Re-Ed             PPT  20x5" 20 x 5"           PPT with marching             Webslide: low row    GTB 2x10 3"         Seated multifidus rotation    GTB 20x5"                                                Ther Ex             Nustep  10 min L5 10 mins  10 min         DKC with PB  10x10" 10 x10"           Quadruped rocking   mirian pose   5 x 10"           Bridging  2x10 3" 2  x10 x 3"           Supine h/s str  3x30" 3  X30"           Supine hip flexor str  3x30" 3 x 30"           Piriformis str  3x30" 3 x30"           HEP review    GR         Ther Activity                                       Gait Training                                       Modalities             TENS eduction and home set up    10 min                      Access Code: [email or web address]: [email or web address]  Date: 2021  Prepared by: Riccardo Roll    Exercises  Supine Piriformis Stretch with Foot on Ground - 1 x daily - 7 x weekly - 3 sets - 1 reps - 30 seconds hold  Supine Quadriceps Stretch with Strap on Table - 1 x daily - 7 x weekly - 3 sets - 1 reps - 30 seconds hold  Supine Hamstring Stretch with Strap - 1 x daily - 7 x weekly - 3 sets - 1 reps - 30 seconds hold  Supine Bridge - 1 x daily - 7 x weekly - 3 sets - 10 reps - 3 seconds hold  Child's Pose Stretch - 1 x daily - 7 x weekly - 2 sets - 10 reps - 10 seconds hold  Supine Double Knee to Chest - 1 x daily - 7 x weekly - 3 sets - 1 reps - 30 seconds hold  Shoulder extension with resistance - Neutral - 1 x daily - 4 x weekly - 3 sets - 10 reps - 3 seconds hold  Seated Lumbar and Thoracic Forward Rotation with Anchored Resistance - 1 x daily - 4 x weekly - 2 sets - 10 reps - 5 seconds hold

## 2021-06-23 ENCOUNTER — OFFICE VISIT (OUTPATIENT)
Dept: PHYSICAL THERAPY | Facility: CLINIC | Age: 66
End: 2021-06-23
Payer: COMMERCIAL

## 2021-06-23 DIAGNOSIS — S39.012D LUMBAR STRAIN, SUBSEQUENT ENCOUNTER: ICD-10-CM

## 2021-06-23 DIAGNOSIS — M51.36 DEGENERATIVE DISC DISEASE, LUMBAR: ICD-10-CM

## 2021-06-23 DIAGNOSIS — M62.9 HAMSTRING TIGHTNESS OF BOTH LOWER EXTREMITIES: ICD-10-CM

## 2021-06-23 DIAGNOSIS — M47.816 LUMBAR FACET ARTHROPATHY: Primary | ICD-10-CM

## 2021-06-23 DIAGNOSIS — R19.8 ABDOMINAL WEAKNESS: ICD-10-CM

## 2021-06-23 PROCEDURE — 97110 THERAPEUTIC EXERCISES: CPT | Performed by: PHYSICAL THERAPIST

## 2021-06-23 PROCEDURE — 97112 NEUROMUSCULAR REEDUCATION: CPT | Performed by: PHYSICAL THERAPIST

## 2021-06-23 NOTE — PROGRESS NOTES
Daily Note     Today's date: 2021  Patient name: Suzette Mcnulty  : 1955  MRN: [id number]  Referring provider: Ru Wakefield DO  Dx:   Encounter Diagnosis     ICD-10-CM    1  Lumbar facet arthropathy  M47 816    2  Degenerative disc disease, lumbar  M51 36    3  Lumbar strain, subsequent encounter  S39 012D    4  Abdominal weakness  R19 8    5  Hamstring tightness of both lower extremities  M62 9        Start Time: 928  Stop Time: 102  Total time in clinic (min): 57 minutes    Subjective: Patient reports that between the exercises and his TENS unit he has not had pain  Objective: See treatment diary below      Assessment: Tolerated treatment well  Patient demonstrated fatigue post treatment, exhibited good technique with therapeutic exercises and would benefit from continued PT  Patient overall progressing well per POC with reported reduction in pain  Plan: Continue per plan of care  Progress treatment as tolerated  Precautions: standard      Manuals                                                             Neuro Re-Ed             PPT  20x5" 20 x 5"           PPT with marching             Webslide: low row    GTB 2x10 3" GTB 3x10 3"        Seated multifidus rotation    GTB 20x5" GTB 20x5"        Multifidus chops     GTB 20x5"        Multifidus lifts     GTB 20x5"        Multifidus walk-outs     GTB 20x5"        Ther Ex             Nustep  10 min L5 10 mins  10 min 10 min L7        DKC with PB  10x10" 10 x10"           Quadruped rocking   mirian pose   5 x 10"           Bridging  2x10 3" 2  x10 x 3"           Supine h/s str  3x30" 3  X30"           Supine hip flexor str  3x30" 3 x 30"   1/2 knee psoas str  10x10"        Piriformis str    3x30" 3 x30"           HEP review    GR         Ther Activity                                       Gait Training                                       Modalities             TENS eduction and home set up    10 min Access Code: Viry Altman  URL: [email or web address]  Date: 06/21/2021  Prepared by: Harriett Copeland    Exercises  Supine Piriformis Stretch with Foot on Ground - 1 x daily - 7 x weekly - 3 sets - 1 reps - 30 seconds hold  Supine Quadriceps Stretch with Strap on Table - 1 x daily - 7 x weekly - 3 sets - 1 reps - 30 seconds hold  Supine Hamstring Stretch with Strap - 1 x daily - 7 x weekly - 3 sets - 1 reps - 30 seconds hold  Supine Bridge - 1 x daily - 7 x weekly - 3 sets - 10 reps - 3 seconds hold  Child's Pose Stretch - 1 x daily - 7 x weekly - 2 sets - 10 reps - 10 seconds hold  Supine Double Knee to Chest - 1 x daily - 7 x weekly - 3 sets - 1 reps - 30 seconds hold  Shoulder extension with resistance - Neutral - 1 x daily - 4 x weekly - 3 sets - 10 reps - 3 seconds hold  Seated Lumbar and Thoracic Forward Rotation with Anchored Resistance - 1 x daily - 4 x weekly - 2 sets - 10 reps - 5 seconds hold

## 2021-06-28 ENCOUNTER — OFFICE VISIT (OUTPATIENT)
Dept: PHYSICAL THERAPY | Facility: CLINIC | Age: 66
End: 2021-06-28
Payer: COMMERCIAL

## 2021-06-28 DIAGNOSIS — M47.816 LUMBAR FACET ARTHROPATHY: Primary | ICD-10-CM

## 2021-06-28 DIAGNOSIS — M51.36 DEGENERATIVE DISC DISEASE, LUMBAR: ICD-10-CM

## 2021-06-28 DIAGNOSIS — M62.9 HAMSTRING TIGHTNESS OF BOTH LOWER EXTREMITIES: ICD-10-CM

## 2021-06-28 DIAGNOSIS — R19.8 ABDOMINAL WEAKNESS: ICD-10-CM

## 2021-06-28 DIAGNOSIS — S39.012D LUMBAR STRAIN, SUBSEQUENT ENCOUNTER: ICD-10-CM

## 2021-06-28 PROCEDURE — 97112 NEUROMUSCULAR REEDUCATION: CPT

## 2021-06-28 PROCEDURE — 97110 THERAPEUTIC EXERCISES: CPT

## 2021-06-28 NOTE — PROGRESS NOTES
Daily Note     Today's date: 2021  Patient name: Elmer Marino  : 1955  MRN: [id number]  Referring provider: Juan Manuel Cummins DO  Dx:   Encounter Diagnosis     ICD-10-CM    1  Lumbar facet arthropathy  M47 816    2  Degenerative disc disease, lumbar  M51 36    3  Hamstring tightness of both lower extremities  M62 9    4  Lumbar strain, subsequent encounter  S39 012D    5  Abdominal weakness  R19 8                   Subjective: Pt noted that he does have some soreness today  Pt noted the tens unit has been helping  Objective: See treatment diary below      Assessment:  Continued with treatment session patient reported having some soreness but noted that he did have some soreness but overall feels like a good muscle burn with TB exercises today  Tolerated treatment fair  Patient exhibited good technique with therapeutic exercises and would benefit from continued PT  Educated in soreness  Pt confirmed he does have the GTB for HEP use  Plan: Continue per plan of care  Precautions: standard      Manuals                                                            Neuro Re-Ed             PPT  20x5" 20 x 5"           PPT with marching             Webslide: low row    GTB 2x10 3" GTB 3x10 3" GTB 30x 3"        Seated multifidus rotation    GTB 20x5" GTB 20x5" GTB  20x 5"        Multifidus chops     GTB 20x5" GTB 20x 5"        Multifidus lifts     GTB 20x5" GTb 20x 5"        Multifidus walk-outs     GTB 20x5" GTB 20x 5"        Ther Ex             Nustep  10 min L5 10 mins  10 min 10 min L7 10 min L7  Arms number 8        DKC with PB  10x10" 10 x10"           Quadruped rocking   mirian pose   5 x 10"           Bridging  2x10 3" 2  x10 x 3"           Supine h/s str  3x30" 3  X30"           Supine hip flexor str  3x30" 3 x 30"   1/2 knee psoas str  10x10" 1/2 knee psoas str 10" x 10        Piriformis str    3x30" 3 x30"           HEP review    GR         Ther Activity Gait Training                                       Modalities             TENS eduction and home set up    10 min                      1 on 1 time on 6/28/21 for 33 minutes  Access Code: [place]  URL: [email or web address]  Date: 06/21/2021  Prepared by: Maico Pod    Exercises  Supine Piriformis Stretch with Foot on Ground - 1 x daily - 7 x weekly - 3 sets - 1 reps - 30 seconds hold  Supine Quadriceps Stretch with Strap on Table - 1 x daily - 7 x weekly - 3 sets - 1 reps - 30 seconds hold  Supine Hamstring Stretch with Strap - 1 x daily - 7 x weekly - 3 sets - 1 reps - 30 seconds hold  Supine Bridge - 1 x daily - 7 x weekly - 3 sets - 10 reps - 3 seconds hold  Child's Pose Stretch - 1 x daily - 7 x weekly - 2 sets - 10 reps - 10 seconds hold  Supine Double Knee to Chest - 1 x daily - 7 x weekly - 3 sets - 1 reps - 30 seconds hold  Shoulder extension with resistance - Neutral - 1 x daily - 4 x weekly - 3 sets - 10 reps - 3 seconds hold  Seated Lumbar and Thoracic Forward Rotation with Anchored Resistance - 1 x daily - 4 x weekly - 2 sets - 10 reps - 5 seconds hold

## 2021-06-30 ENCOUNTER — OFFICE VISIT (OUTPATIENT)
Dept: PHYSICAL THERAPY | Facility: CLINIC | Age: 66
End: 2021-06-30
Payer: COMMERCIAL

## 2021-06-30 DIAGNOSIS — M51.36 DEGENERATIVE DISC DISEASE, LUMBAR: ICD-10-CM

## 2021-06-30 DIAGNOSIS — R19.8 ABDOMINAL WEAKNESS: ICD-10-CM

## 2021-06-30 DIAGNOSIS — S39.012D LUMBAR STRAIN, SUBSEQUENT ENCOUNTER: ICD-10-CM

## 2021-06-30 DIAGNOSIS — M62.9 HAMSTRING TIGHTNESS OF BOTH LOWER EXTREMITIES: ICD-10-CM

## 2021-06-30 DIAGNOSIS — M47.816 LUMBAR FACET ARTHROPATHY: Primary | ICD-10-CM

## 2021-06-30 PROCEDURE — 97112 NEUROMUSCULAR REEDUCATION: CPT

## 2021-06-30 PROCEDURE — 97110 THERAPEUTIC EXERCISES: CPT

## 2021-06-30 NOTE — PROGRESS NOTES
Daily Note     Today's date: 2021  Patient name: Ellyn Stanton  : 1955  MRN: [id number]  Referring provider: Shabana Preciado DO  Dx:   Encounter Diagnosis     ICD-10-CM    1  Lumbar facet arthropathy  M47 816    2  Abdominal weakness  R19 8    3  Degenerative disc disease, lumbar  M51 36    4  Hamstring tightness of both lower extremities  M62 9    5  Lumbar strain, subsequent encounter  S39 012D        Start Time: 845  Stop Time: 949  Total time in clinic (min): 64 minutes    Subjective: pt noted that he felt good after last treatment session and noted that he may have over did it a little  But noted today his pain in a little less  Objective: See treatment diary below      Assessment:  Continued with treatment session, progressed patinet as he is able  Advised patient to perform the TA contraction throughout his day and with daily activities that bother his back  Tolerated treatment fair  Patient exhibited good technique with therapeutic exercises and would benefit from continued PT  S/p treatment session,  Pt noted no changes and feeling good  Plan: Continue per plan of care  ask patient if the TA contraction has been helping with 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        Precautions: standard      Manuals                                                           Neuro Re-Ed             PPT  20x5" 20 x 5"           PPT with marching             Webslide: low row    GTB 2x10 3" GTB 3x10 3" GTB 30x 3"  Missed NV resume       Seated multifidus rotation    GTB 20x5" GTB 20x5" GTB  20x 5"  GTB 20x 5"       Multifidus chops     GTB 20x5" GTB 20x 5"  GTb 5" 20x       Multifidus lifts     GTB 20x5" GTb 20x 5"  GTB 20x 5"       Multifidus walk-outs     GTB 20x5" GTB 20x 5"  GTB 20x 5"      Ther Ex             Nustep  10 min L5 10 mins  10 min 10 min L7 10 min L7  Arms number 8  10 min L7  Arms number 8       DKC with PB  10x10" 10 x10"           Quadruped rocking mirian pose   5 x 10"           Bridging  2x10 3" 2  x10 x 3"     2x 10 with TA       Supine h/s str  3x30" 3  X30"           Supine hip flexor str  3x30" 3 x 30"   1/2 knee psoas str  10x10" 1/2 knee psoas str 10" x 10  1/2 knee psoas str  10" x 10       Piriformis str  3x30" 3 x30"     3x 30"       HEP review    GR         Ther Activity                                       Gait Training                                       Modalities             TENS eduction and home set up    10 min                          Access Code: Sheryle Dopp: [email or web address]  Date: 06/21/2021  Prepared by: Lary Brar    Exercises  Supine Piriformis Stretch with Foot on Ground - 1 x daily - 7 x weekly - 3 sets - 1 reps - 30 seconds hold  Supine Quadriceps Stretch with Strap on Table - 1 x daily - 7 x weekly - 3 sets - 1 reps - 30 seconds hold  Supine Hamstring Stretch with Strap - 1 x daily - 7 x weekly - 3 sets - 1 reps - 30 seconds hold  Supine Bridge - 1 x daily - 7 x weekly - 3 sets - 10 reps - 3 seconds hold  Child's Pose Stretch - 1 x daily - 7 x weekly - 2 sets - 10 reps - 10 seconds hold  Supine Double Knee to Chest - 1 x daily - 7 x weekly - 3 sets - 1 reps - 30 seconds hold  Shoulder extension with resistance - Neutral - 1 x daily - 4 x weekly - 3 sets - 10 reps - 3 seconds hold  Seated Lumbar and Thoracic Forward Rotation with Anchored Resistance - 1 x daily - 4 x weekly - 2 sets - 10 reps - 5 seconds hold

## 2021-07-06 ENCOUNTER — APPOINTMENT (OUTPATIENT)
Dept: PHYSICAL THERAPY | Facility: CLINIC | Age: 66
End: 2021-07-06
Payer: COMMERCIAL

## 2021-07-08 ENCOUNTER — APPOINTMENT (OUTPATIENT)
Dept: PHYSICAL THERAPY | Facility: CLINIC | Age: 66
End: 2021-07-08
Payer: COMMERCIAL

## 2021-07-08 NOTE — PROGRESS NOTES
PT Re-Evaluation     Today's date: 2021  Patient name: Sammi Farrar  : 1955  MRN: [id number]  Referring provider: Xiang Garcia DO  Dx:   Encounter Diagnosis     ICD-10-CM    1  Lumbar facet arthropathy  M47 816    2  Abdominal weakness  R19 8    3  Degenerative disc disease, lumbar  M51 36    4  Hamstring tightness of both lower extremities  M62 9    5  Lumbar strain, subsequent encounter  S39 012D                   Assessment/Plan    Subjective Evaluation    History of Present Illness  Mechanism of injury:     Patient's next scheduled f/u appointment with ortho is   Treatments  Current treatment: physical therapy  Patient Goals  Patient goals for therapy: decreased pain and increased strength          Objective     Postural Observations  Seated posture: fair  Standing posture: good        Palpation   Left   Muscle spasm in the erector spinae and lumbar paraspinals  Tenderness of the erector spinae and lumbar paraspinals  Right   Muscle spasm in the erector spinae and lumbar paraspinals  Tenderness of the erector spinae and lumbar paraspinals  Tenderness     Lumbar Spine  Tenderness in the spinous process and left transverse process  No tenderness in the right transverse process  Neurological Testing     Sensation     Lumbar   Left   Intact: light touch    Right   Intact: light touch    Reflexes   Left   Patellar (L4): normal (2+)  Achilles (S1): normal (2+)    Right   Patellar (L4): normal (2+)  Achilles (S1): normal (2+)    Active Range of Motion     Lumbar   Flexion: Active lumbar flexion: (+) left-sided low back tightness  WFL  Extension: Active lumbar extension: (+) left-sided pressure    with pain Restriction level: moderate  Left lateral flexion:  Restriction level: minimal  Right lateral flexion:  Restriction level: minimal  Left rotation:  with pain Restriction level: moderate  Right rotation:  with pain Restriction level: moderate    Passive Range of Motion   Left Hip   Normal passive range of motion    Right Hip   Normal passive range of motion    Joint Play     Hypomobile: L1, L2, L3, L4, L5 and S1     Pain: L1, L2, L3, L4, L5 and S1     Strength/Myotome Testing     Lumbar   Left   Heel walk: normal  Toe walk: normal    Right   Heel walk: normal  Toe walk: normal    Left Hip   Planes of Motion   Flexion: 4    Right Hip   Planes of Motion   Flexion: 4+    Left Knee   Flexion: 5  Extension: 5    Right Knee   Flexion: 5  Extension: 5    Left Ankle/Foot   Dorsiflexion: 5    Right Ankle/Foot   Dorsiflexion: 5    Muscle Activation     Additional Muscle Activation Details  Transverse abdominus activation: poor    Tests     Lumbar     Left   Negative crossed SLR, passive SLR and slump test      Right   Negative crossed SLR, passive SLR and slump test      Left Hip   Negative GABRIEL, FADIR and long sit  Right Hip   Positive long sit (R > L - unchanging)  Negative GABRIEL and FADIR  Ambulation     Ambulation: Level Surfaces   Ambulation without assistive device: independent    Observational Gait   Gait: within functional limits     Functional Assessment      Squat    Left within functional limits and right within functional limits                Precautions: standard      Manuals 6/8 6/14 6/16 6/21 6/23 6/28 6/30 7/8                                                         Neuro Re-Ed             PPT  20x5" 20 x 5"           PPT with marching             Webslide: low row    GTB 2x10 3" GTB 3x10 3" GTB 30x 3"  Missed NV resume       Seated multifidus rotation    GTB 20x5" GTB 20x5" GTB  20x 5"  GTB 20x 5"       Multifidus chops     GTB 20x5" GTB 20x 5"  GTb 5" 20x       Multifidus lifts     GTB 20x5" GTb 20x 5"  GTB 20x 5"       Multifidus walk-outs     GTB 20x5" GTB 20x 5"  GTB 20x 5"      Ther Ex             Nustep  10 min L5 10 mins  10 min 10 min L7 10 min L7  Arms number 8  10 min L7  Arms number 8       DKC with PB  10x10" 10 x10"           Quadruped rocking   mirian pose 5 x 10"           Bridging  2x10 3" 2  x10 x 3"     2x 10 with TA       Supine h/s str  3x30" 3  X30"           Supine hip flexor str  3x30" 3 x 30"   1/2 knee psoas str  10x10" 1/2 knee psoas str 10" x 10  1/2 knee psoas str  10" x 10       Piriformis str  3x30" 3 x30"     3x 30"       HEP review    GR         Ther Activity                                       Gait Training                                       Modalities             TENS eduction and home set up    10 min                          Access Code: Jenaro Snow: [email or web address]  Date: 06/21/2021  Prepared by: Marina Conde    Exercises  Supine Piriformis Stretch with Foot on Ground - 1 x daily - 7 x weekly - 3 sets - 1 reps - 30 seconds hold  Supine Quadriceps Stretch with Strap on Table - 1 x daily - 7 x weekly - 3 sets - 1 reps - 30 seconds hold  Supine Hamstring Stretch with Strap - 1 x daily - 7 x weekly - 3 sets - 1 reps - 30 seconds hold  Supine Bridge - 1 x daily - 7 x weekly - 3 sets - 10 reps - 3 seconds hold  Child's Pose Stretch - 1 x daily - 7 x weekly - 2 sets - 10 reps - 10 seconds hold  Supine Double Knee to Chest - 1 x daily - 7 x weekly - 3 sets - 1 reps - 30 seconds hold  Shoulder extension with resistance - Neutral - 1 x daily - 4 x weekly - 3 sets - 10 reps - 3 seconds hold  Seated Lumbar and Thoracic Forward Rotation with Anchored Resistance - 1 x daily - 4 x weekly - 2 sets - 10 reps - 5 seconds hold

## 2021-07-14 ENCOUNTER — EVALUATION (OUTPATIENT)
Dept: PHYSICAL THERAPY | Facility: CLINIC | Age: 66
End: 2021-07-14
Payer: COMMERCIAL

## 2021-07-14 DIAGNOSIS — S39.012D LUMBAR STRAIN, SUBSEQUENT ENCOUNTER: ICD-10-CM

## 2021-07-14 DIAGNOSIS — M62.9 HAMSTRING TIGHTNESS OF BOTH LOWER EXTREMITIES: ICD-10-CM

## 2021-07-14 DIAGNOSIS — M47.816 LUMBAR FACET ARTHROPATHY: Primary | ICD-10-CM

## 2021-07-14 DIAGNOSIS — R19.8 ABDOMINAL WEAKNESS: ICD-10-CM

## 2021-07-14 DIAGNOSIS — M51.36 DEGENERATIVE DISC DISEASE, LUMBAR: ICD-10-CM

## 2021-07-14 PROCEDURE — 97110 THERAPEUTIC EXERCISES: CPT | Performed by: PHYSICAL THERAPIST

## 2021-07-14 PROCEDURE — 97140 MANUAL THERAPY 1/> REGIONS: CPT | Performed by: PHYSICAL THERAPIST

## 2021-07-14 NOTE — PROGRESS NOTES
PT Re-Evaluation     Today's date: 2021  Patient name: Arianna Schaffer  : 1955  MRN: [id number]  Referring provider: Jacquie Kunz DO  Dx:   Encounter Diagnosis     ICD-10-CM    1  Lumbar facet arthropathy  M47 816    2  Abdominal weakness  R19 8    3  Degenerative disc disease, lumbar  M51 36    4  Hamstring tightness of both lower extremities  M62 9    5  Lumbar strain, subsequent encounter  S39 012D        Start Time: 845  Stop Time: 940  Total time in clinic (min): 55 minutes    Assessment  Assessment details: Arianna Schaffer is a 72 y o  male who presents with pain, decreased strength, decreased ROM, decreased joint mobility, impaired sensation and postural  dysfunction  Due to these impairments, Patient has difficulty performing a/iadls, recreational activities and engaging in social activities  Patient's clinical presentation is consistent with their referring diagnosis of lumbar DDD  Patient would benefit from skilled physical therapy to address their aforementioned impairments, improve their level of function and to improve their overall quality of life  Impairments: abnormal muscle firing, abnormal or restricted ROM, activity intolerance, impaired physical strength, lacks appropriate home exercise program, pain with function, weight-bearing intolerance and poor posture   Understanding of Dx/Px/POC: excellent  Goals  Short Term Goals: to be achieved by 4 weeks  1) Patient to be independent with basic HEP  MET  2) Decrease pain to 4/10 at its worst  PROGRESSED, BUT NOT MET  3) Increase lumbar spine ROM by 25% in all deficient planes  MET  4) Increase LE strength by 1/2 MMT grade in all deficient planes  MET  5) Patient to report decreased sleep interruption secondary to pain  MET  6) Increase ambulatory tolerance by 10 min  MET    Long Term Goals: to be achieved by discharge ALL GOALS PROGRESSING  1) FOTO equal to or greater than TBD    2) Patient to be independent with comprehensive ache    Treatments  Current treatment: physical therapy  Patient Goals  Patient goals for therapy: decreased pain and increased strength          Objective     Postural Observations  Seated posture: fair  Standing posture: good        Palpation   Left   No palpable tenderness to the erector spinae and lumbar paraspinals  Right   No palpable tenderness to the erector spinae and lumbar paraspinals  Tenderness     Lumbar Spine  Tenderness in the spinous process  No tenderness in the left transverse process and right transverse process  Neurological Testing     Sensation     Lumbar   Left   Intact: light touch    Right   Intact: light touch    Reflexes   Left   Patellar (L4): normal (2+)  Achilles (S1): normal (2+)    Right   Patellar (L4): normal (2+)  Achilles (S1): normal (2+)    Active Range of Motion     Lumbar   Flexion: Active lumbar flexion: (+) left-sided low back tightness  WFL  Extension: Active lumbar extension: (+) left-sided pressure    Restriction level: minimal  Left lateral flexion:  WFL  Right lateral flexion:  WFL  Left rotation:  with pain Restriction level: minimal  Right rotation:  with pain Restriction level: minimal    Passive Range of Motion   Left Hip   Normal passive range of motion    Right Hip   Normal passive range of motion    Joint Play     Hypomobile: L1, L2, L3, L4, L5 and S1     Pain: L1, L2, L3, L4, L5 and S1     Strength/Myotome Testing     Left Hip   Planes of Motion   Flexion: 5  Extension: 5  Abduction: 5    Right Hip   Planes of Motion   Flexion: 5  Extension: 5  Abduction: 5    Left Knee   Flexion: 5  Extension: 5    Right Knee   Flexion: 5  Extension: 5    Left Ankle/Foot   Dorsiflexion: 5    Right Ankle/Foot   Dorsiflexion: 5    Muscle Activation     Additional Muscle Activation Details  Transverse abdominus activation: fair    Ambulation     Ambulation: Level Surfaces   Ambulation without assistive device: independent    Observational Gait   Gait: within functional limits     Functional Assessment      Squat    Left within functional limits and right within functional limits  Precautions: standard      Manuals 6/8 6/14 6/16 6/21 6/23 6/28 6/30 7/8 7/14    Reassessment         GR    Gr  II-IV central lumbar PA mobs         NV    Lumbar, pelvic rocking         NV                 Neuro Re-Ed             PPT  20x5" 20 x 5"           PPT with marching             Webslide: low row    GTB 2x10 3" GTB 3x10 3" GTB 30x 3"  Missed NV resume       Seated multifidus rotation    GTB 20x5" GTB 20x5" GTB  20x 5"  GTB 20x 5"       Multifidus chops     GTB 20x5" GTB 20x 5"  GTb 5" 20x       Multifidus lifts     GTB 20x5" GTb 20x 5"  GTB 20x 5"       Multifidus walk-outs     GTB 20x5" GTB 20x 5"  GTB 20x 5"      Ther Ex             Nustep  10 min L5 10 mins  10 min 10 min L7 10 min L7  Arms number 8  10 min L7  Arms number 8       DKC with PB  10x10" 10 x10"           Quadruped rocking   mirian pose   5 x 10"           Bridging  2x10 3" 2  x10 x 3"     2x 10 with TA       Supine h/s str  3x30" 3  X30"           Supine hip flexor str  3x30" 3 x 30"   1/2 knee psoas str  10x10" 1/2 knee psoas str 10" x 10  1/2 knee psoas str  10" x 10       Piriformis str  3x30" 3 x30"     3x 30"       Patient education, HEP review    GR    GR GR    Open books        NV NV    Standing lat/QL str  NV NV                 Ther Activity                                       Gait Training                                       Modalities             TENS eduction and home set up    10 min                          Access Code: Michelle La  URL: [email or web address]  Date: 06/21/2021  Prepared by: Julisa Law    Exercises  Supine Piriformis Stretch with Foot on Ground - 1 x daily - 7 x weekly - 3 sets - 1 reps - 30 seconds hold  Supine Quadriceps Stretch with Strap on Table - 1 x daily - 7 x weekly - 3 sets - 1 reps - 30 seconds hold  Supine Hamstring Stretch with Strap - 1 x daily - 7 x weekly - 3 sets - 1 reps - 30 seconds hold  Supine Bridge - 1 x daily - 7 x weekly - 3 sets - 10 reps - 3 seconds hold  Child's Pose Stretch - 1 x daily - 7 x weekly - 2 sets - 10 reps - 10 seconds hold  Supine Double Knee to Chest - 1 x daily - 7 x weekly - 3 sets - 1 reps - 30 seconds hold  Shoulder extension with resistance - Neutral - 1 x daily - 4 x weekly - 3 sets - 10 reps - 3 seconds hold  Seated Lumbar and Thoracic Forward Rotation with Anchored Resistance - 1 x daily - 4 x weekly - 2 sets - 10 reps - 5 seconds hold

## 2021-07-21 ENCOUNTER — OFFICE VISIT (OUTPATIENT)
Dept: OBGYN CLINIC | Facility: CLINIC | Age: 66
End: 2021-07-21

## 2021-07-21 VITALS
HEART RATE: 57 BPM | WEIGHT: 224 LBS | SYSTOLIC BLOOD PRESSURE: 167 MMHG | DIASTOLIC BLOOD PRESSURE: 105 MMHG | HEIGHT: 67 IN | BODY MASS INDEX: 35.16 KG/M2

## 2021-07-21 DIAGNOSIS — M47.816 LUMBAR FACET ARTHROPATHY: Primary | ICD-10-CM

## 2021-07-21 DIAGNOSIS — M51.36 DEGENERATIVE DISC DISEASE, LUMBAR: ICD-10-CM

## 2021-07-21 DIAGNOSIS — S39.012D LUMBAR STRAIN, SUBSEQUENT ENCOUNTER: ICD-10-CM

## 2021-07-21 PROCEDURE — 99214 OFFICE O/P EST MOD 30 MIN: CPT | Performed by: FAMILY MEDICINE

## 2021-07-21 NOTE — PROGRESS NOTES
Assessment/Plan:  Assessment/Plan   Diagnoses and all orders for this visit:    Lumbar facet arthropathy    Degenerative disc disease, lumbar    Lumbar strain, subsequent encounter           59-year-old male with much improved low back pain, that started more than 3 months ago  Discussed with patient physical exam, impression and plan  Physical exam of lumbar spine is unremarkable for midline or paraspinal tenderness  He has normal range of motion of lumbar spine  Normal strength and sensation in both lower extremities  Clinical impression is that he has significant improved regard to the inflammatory component of his symptoms and from strain of lumbar spine  Recommend he continue with home exercise program on a regular basis and also to focus on core strengthening  He is to continue with use of electrical stimulation unit  He will follow up as needed  Subjective:   Patient ID: Guille Charles is a 72 y o  male  Chief Complaint   Patient presents with    Lower Back - Follow-up       59-year-old male following up for low back pain that started more than 3 months ago  He was last seen by me 8 weeks ago at which point he was referred to formal physical therapy  He is advised to continue with taking cyclobenzaprine and Tylenol  He was referred for electrical stimulation unit and advised on taking supplements  Been attending physical therapy and doing home exercises since 06/28/2021  He reports feeling significant better since his last visit and currently denies any pain of the back  He has also been using electrical stimulation unit on a regular basis  He reports having 1 episode of numbness in left lower extremity and was advised by physical therapist to sleep with a pillow between his legs  He is retired from work and will be moving down to [place]  Back Pain  This is a new problem  The current episode started more than 1 month ago  The problem has been resolved   Pertinent negatives include no arthralgias, joint swelling, numbness or weakness  Nothing aggravates the symptoms  He has tried rest, NSAIDs and acetaminophen (Physical therapy, home exercise, muscle ice, electrical stimulation) for the symptoms  The treatment provided significant relief  Review of Systems   Musculoskeletal: Positive for back pain  Negative for arthralgias and joint swelling  Neurological: Negative for weakness and numbness  Objective:  Vitals:    07/21/21 0804   BP: (!) 167/105   Pulse: 57   Weight: 102 kg (224 lb)   Height: 5' 7" (1 702 m)     Right Ankle Exam     Muscle Strength   Dorsiflexion:  5/5  Plantar flexion:  5/5      Left Ankle Exam     Muscle Strength   Dorsiflexion:  5/5   Plantar flexion:  5/5       Right Hip Exam     Muscle Strength   Flexion: 5/5     Comments:  Negative FADDIR      Left Hip Exam     Muscle Strength   Flexion: 5/5     Comments:  Negative FADDIR      Back Exam     Tenderness   The patient is experiencing no tenderness  Range of Motion   The patient has normal back ROM  Muscle Strength   Right Quadriceps:  5/5   Left Quadriceps:  5/5     Tests   Straight leg raise right: negative  Straight leg raise left: negative    Other   Sensation: normal  Gait: normal           Strength/Myotome Testing     Left Ankle/Foot   Dorsiflexion: 5  Plantar flexion: 5    Right Ankle/Foot   Dorsiflexion: 5  Plantar flexion: 5      Physical Exam  Vitals and nursing note reviewed  Constitutional:       General: He is not in acute distress  Appearance: He is well-developed  He is not ill-appearing or diaphoretic  HENT:      Head: Normocephalic and atraumatic  Right Ear: External ear normal       Left Ear: External ear normal    Eyes:      Conjunctiva/sclera: Conjunctivae normal    Neck:      Trachea: No tracheal deviation  Cardiovascular:      Rate and Rhythm: Normal rate  Pulmonary:      Effort: Pulmonary effort is normal  No respiratory distress     Abdominal:      General: There is no distension  Musculoskeletal:         General: No swelling, tenderness, deformity or signs of injury  Lumbar back: Negative right straight leg raise test and negative left straight leg raise test       Right lower leg: No edema  Left lower leg: No edema  Skin:     General: Skin is warm and dry  Coloration: Skin is not jaundiced or pale  Neurological:      Mental Status: He is alert and oriented to person, place, and time  Psychiatric:         Mood and Affect: Mood normal          Behavior: Behavior normal          Thought Content:  Thought content normal          Judgment: Judgment normal

## 2021-08-09 NOTE — PROGRESS NOTES
Joy Hines has attended a total of 8 physical therapy appointments to date  Patient was last treated on 6/30 and has no remaining appointments scheduled  Goals, objective and subjective information unable to be updated at this time  Patient will be discharged from physical therapy secondary to inactivity

## 2021-08-30 DIAGNOSIS — I10 ESSENTIAL HYPERTENSION: ICD-10-CM

## 2021-08-30 RX ORDER — VERAPAMIL HYDROCHLORIDE 240 MG/1
240 TABLET, FILM COATED, EXTENDED RELEASE ORAL
Qty: 90 TABLET | Refills: 0 | Status: SHIPPED | OUTPATIENT
Start: 2021-08-30

## 2021-08-30 NOTE — TELEPHONE ENCOUNTER
Patient just moved to [place]   Is going to find new PCP there  (yes, the [facility])

## 2021-09-16 DIAGNOSIS — N52.9 ERECTILE DYSFUNCTION, UNSPECIFIED ERECTILE DYSFUNCTION TYPE: ICD-10-CM

## 2021-09-16 RX ORDER — SILDENAFIL CITRATE 20 MG/1
20 TABLET ORAL DAILY PRN
Qty: 50 TABLET | Refills: 3 | Status: SHIPPED | OUTPATIENT
Start: 2021-09-16

## 2021-09-16 RX ORDER — SILDENAFIL CITRATE 20 MG/1
20 TABLET ORAL DAILY PRN
Qty: 50 TABLET | Refills: 3 | Status: SHIPPED | OUTPATIENT
Start: 2021-09-16 | End: 2021-09-16 | Stop reason: SDUPTHER

## 2021-10-13 ENCOUNTER — TELEPHONE (OUTPATIENT)
Dept: FAMILY MEDICINE CLINIC | Facility: CLINIC | Age: 66
End: 2021-10-13

## 2021-11-23 ENCOUNTER — TELEPHONE (OUTPATIENT)
Dept: FAMILY MEDICINE CLINIC | Facility: CLINIC | Age: 66
End: 2021-11-23

## 2022-10-12 PROBLEM — Z12.5 SCREENING FOR PROSTATE CANCER: Status: RESOLVED | Noted: 2021-02-17 | Resolved: 2022-10-12

## 2023-08-21 ENCOUNTER — TELEPHONE (OUTPATIENT)
Age: 68
End: 2023-08-21

## 2023-08-21 NOTE — TELEPHONE ENCOUNTER
Patients GI provider:  Dr. Lisa Coy    Number to return call: [phone]    Reason for call: Pt calling to find out when can he schedule his next colonoscopy. Pt is looking to have a recommendations from Dr. Lisa Coy. Pt had procedure done in 2019.   Scheduled procedure/appointment date if applicable: Apt/procedure n/a

## 2023-08-21 NOTE — TELEPHONE ENCOUNTER
Pt's last colonoscopy was 2019 and has not been seen since at the office for follow up. Please advise, thank you.